# Patient Record
Sex: FEMALE | Race: WHITE | NOT HISPANIC OR LATINO | Employment: OTHER | URBAN - METROPOLITAN AREA
[De-identification: names, ages, dates, MRNs, and addresses within clinical notes are randomized per-mention and may not be internally consistent; named-entity substitution may affect disease eponyms.]

---

## 2017-09-19 DIAGNOSIS — Z01.812 PRE-PROCEDURAL LABORATORY EXAMINATION: ICD-10-CM

## 2017-09-19 LAB
ANION GAP SERPL CALC-SCNC: 6 MMOL/L (ref 0–11.9)
BUN SERPL-MCNC: 17 MG/DL (ref 8–22)
CALCIUM SERPL-MCNC: 9.7 MG/DL (ref 8.5–10.5)
CHLORIDE SERPL-SCNC: 104 MMOL/L (ref 96–112)
CO2 SERPL-SCNC: 26 MMOL/L (ref 20–33)
CREAT SERPL-MCNC: 0.67 MG/DL (ref 0.5–1.4)
ERYTHROCYTE [DISTWIDTH] IN BLOOD BY AUTOMATED COUNT: 44.9 FL (ref 35.9–50)
GFR SERPL CREATININE-BSD FRML MDRD: >60 ML/MIN/1.73 M 2
GLUCOSE SERPL-MCNC: 81 MG/DL (ref 65–99)
HCT VFR BLD AUTO: 43.8 % (ref 37–47)
HGB BLD-MCNC: 14.3 G/DL (ref 12–16)
MCH RBC QN AUTO: 31.3 PG (ref 27–33)
MCHC RBC AUTO-ENTMCNC: 32.6 G/DL (ref 33.6–35)
MCV RBC AUTO: 95.8 FL (ref 81.4–97.8)
PLATELET # BLD AUTO: 196 K/UL (ref 164–446)
PMV BLD AUTO: 10.2 FL (ref 9–12.9)
POTASSIUM SERPL-SCNC: 3.8 MMOL/L (ref 3.6–5.5)
RBC # BLD AUTO: 4.57 M/UL (ref 4.2–5.4)
SODIUM SERPL-SCNC: 136 MMOL/L (ref 135–145)
WBC # BLD AUTO: 3.8 K/UL (ref 4.8–10.8)

## 2017-09-19 PROCEDURE — 85027 COMPLETE CBC AUTOMATED: CPT

## 2017-09-19 PROCEDURE — 36415 COLL VENOUS BLD VENIPUNCTURE: CPT

## 2017-09-19 PROCEDURE — 80048 BASIC METABOLIC PNL TOTAL CA: CPT

## 2017-09-19 RX ORDER — ESTRADIOL AND NORETHINDRONE ACETATE 1; .5 MG/1; MG/1
1 TABLET ORAL DAILY
COMMUNITY
End: 2018-05-17 | Stop reason: SDUPTHER

## 2017-09-28 ENCOUNTER — HOSPITAL ENCOUNTER (OUTPATIENT)
Facility: MEDICAL CENTER | Age: 52
End: 2017-09-28
Attending: SURGERY | Admitting: SURGERY
Payer: COMMERCIAL

## 2017-09-28 VITALS
DIASTOLIC BLOOD PRESSURE: 62 MMHG | HEIGHT: 69 IN | BODY MASS INDEX: 22.27 KG/M2 | OXYGEN SATURATION: 99 % | SYSTOLIC BLOOD PRESSURE: 93 MMHG | RESPIRATION RATE: 18 BRPM | TEMPERATURE: 97.8 F | WEIGHT: 150.35 LBS | HEART RATE: 62 BPM

## 2017-09-28 PROBLEM — K80.20 BILIARY CALCULUS: Status: ACTIVE | Noted: 2017-09-28

## 2017-09-28 PROCEDURE — 160039 HCHG SURGERY MINUTES - EA ADDL 1 MIN LEVEL 3: Performed by: SURGERY

## 2017-09-28 PROCEDURE — 700102 HCHG RX REV CODE 250 W/ 637 OVERRIDE(OP)

## 2017-09-28 PROCEDURE — 501586 HCHG TROCAR, THRD SPIKE 5X55: Performed by: SURGERY

## 2017-09-28 PROCEDURE — 160028 HCHG SURGERY MINUTES - 1ST 30 MINS LEVEL 3: Performed by: SURGERY

## 2017-09-28 PROCEDURE — 502571 HCHG PACK, LAP CHOLE: Performed by: SURGERY

## 2017-09-28 PROCEDURE — 160002 HCHG RECOVERY MINUTES (STAT): Performed by: SURGERY

## 2017-09-28 PROCEDURE — A9270 NON-COVERED ITEM OR SERVICE: HCPCS

## 2017-09-28 PROCEDURE — 501574 HCHG TROCAR, SMTH CAN&SEAL 5: Performed by: SURGERY

## 2017-09-28 PROCEDURE — 700101 HCHG RX REV CODE 250

## 2017-09-28 PROCEDURE — 160009 HCHG ANES TIME/MIN: Performed by: SURGERY

## 2017-09-28 PROCEDURE — 160025 RECOVERY II MINUTES (STATS): Performed by: SURGERY

## 2017-09-28 PROCEDURE — 88304 TISSUE EXAM BY PATHOLOGIST: CPT

## 2017-09-28 PROCEDURE — 160047 HCHG PACU  - EA ADDL 30 MINS PHASE II: Performed by: SURGERY

## 2017-09-28 PROCEDURE — A6402 STERILE GAUZE <= 16 SQ IN: HCPCS | Performed by: SURGERY

## 2017-09-28 PROCEDURE — 501838 HCHG SUTURE GENERAL: Performed by: SURGERY

## 2017-09-28 PROCEDURE — 160046 HCHG PACU - 1ST 60 MINS PHASE II: Performed by: SURGERY

## 2017-09-28 PROCEDURE — 160048 HCHG OR STATISTICAL LEVEL 1-5: Performed by: SURGERY

## 2017-09-28 PROCEDURE — 700111 HCHG RX REV CODE 636 W/ 250 OVERRIDE (IP)

## 2017-09-28 PROCEDURE — 160035 HCHG PACU - 1ST 60 MINS PHASE I: Performed by: SURGERY

## 2017-09-28 PROCEDURE — 500868 HCHG NEEDLE, SURGI(VARES): Performed by: SURGERY

## 2017-09-28 PROCEDURE — 500514 HCHG ENDOCLIP: Performed by: SURGERY

## 2017-09-28 RX ORDER — BUPIVACAINE HYDROCHLORIDE 2.5 MG/ML
INJECTION, SOLUTION EPIDURAL; INFILTRATION; INTRACAUDAL
Status: DISCONTINUED | OUTPATIENT
Start: 2017-09-28 | End: 2017-09-28 | Stop reason: HOSPADM

## 2017-09-28 RX ORDER — OXYCODONE HYDROCHLORIDE AND ACETAMINOPHEN 5; 325 MG/1; MG/1
TABLET ORAL
Status: COMPLETED
Start: 2017-09-28 | End: 2017-09-28

## 2017-09-28 RX ORDER — HYDROCODONE BITARTRATE AND ACETAMINOPHEN 5; 325 MG/1; MG/1
1-2 TABLET ORAL EVERY 4 HOURS PRN
Status: DISCONTINUED | OUTPATIENT
Start: 2017-09-28 | End: 2017-09-28 | Stop reason: HOSPADM

## 2017-09-28 RX ORDER — LIDOCAINE AND PRILOCAINE 25; 25 MG/G; MG/G
1 CREAM TOPICAL
Status: COMPLETED | OUTPATIENT
Start: 2017-09-28 | End: 2017-09-28

## 2017-09-28 RX ORDER — LIDOCAINE HYDROCHLORIDE 10 MG/ML
INJECTION, SOLUTION INFILTRATION; PERINEURAL
Status: COMPLETED
Start: 2017-09-28 | End: 2017-09-28

## 2017-09-28 RX ORDER — SODIUM CHLORIDE, SODIUM LACTATE, POTASSIUM CHLORIDE, CALCIUM CHLORIDE 600; 310; 30; 20 MG/100ML; MG/100ML; MG/100ML; MG/100ML
1000 INJECTION, SOLUTION INTRAVENOUS
Status: DISCONTINUED | OUTPATIENT
Start: 2017-09-28 | End: 2017-09-28 | Stop reason: HOSPADM

## 2017-09-28 RX ORDER — LIDOCAINE HYDROCHLORIDE 10 MG/ML
0.5 INJECTION, SOLUTION INFILTRATION; PERINEURAL
Status: COMPLETED | OUTPATIENT
Start: 2017-09-28 | End: 2017-09-28

## 2017-09-28 RX ORDER — SODIUM CHLORIDE AND POTASSIUM CHLORIDE 150; 900 MG/100ML; MG/100ML
INJECTION, SOLUTION INTRAVENOUS CONTINUOUS
Status: DISCONTINUED | OUTPATIENT
Start: 2017-09-28 | End: 2017-09-28 | Stop reason: HOSPADM

## 2017-09-28 RX ADMIN — LIDOCAINE HYDROCHLORIDE 0.5 ML: 10 INJECTION, SOLUTION INFILTRATION; PERINEURAL at 11:50

## 2017-09-28 RX ADMIN — OXYCODONE AND ACETAMINOPHEN 2 TABLET: 5; 325 TABLET ORAL at 14:30

## 2017-09-28 ASSESSMENT — PAIN SCALES - GENERAL
PAINLEVEL_OUTOF10: 0
PAINLEVEL_OUTOF10: 3
PAINLEVEL_OUTOF10: ASSUMED PAIN PRESENT
PAINLEVEL_OUTOF10: 1
PAINLEVEL_OUTOF10: 3
PAINLEVEL_OUTOF10: 3

## 2017-09-28 NOTE — DISCHARGE INSTRUCTIONS
ACTIVITY: Rest and take it easy for the first 24 hours.  A responsible adult is recommended to remain with you during that time.  It is normal to feel sleepy.  We encourage you to not do anything that requires balance, judgment or coordination.    MILD FLU-LIKE SYMPTOMS ARE NORMAL. YOU MAY EXPERIENCE GENERALIZED MUSCLE ACHES, THROAT IRRITATION, HEADACHE AND/OR SOME NAUSEA.    FOR 24 HOURS DO NOT:  Drive, operate machinery or run household appliances.  Drink beer or alcoholic beverages.   Make important decisions or sign legal documents.    SPECIAL INSTRUCTIONS: Laparoscopic Cholecystectomy D/C instructions:     DIET: Upon discharge from the hospital you may resume your normal preoperative diet. Depending on how you are feeling and whether you have nausea or not, you may wish to stay with a bland diet for the first few days. However, you can advance this as quickly as you feel ready.     ACTIVITIES: After discharge from the hospital, you may resume full routine activities. However, there should be no heavy lifting (greater than 15 pounds) and no strenuous activities until after your follow-up visit. Otherwise, routine activities of daily living are acceptable.     DRIVING: You may drive whenever you are off pain medications and are able to perform the activities needed to drive, i.e. turning, bending, twisting, etc.     BATHING: You may get the wound wet at any time after leaving the hospital. You may shower, but do not submerge in a bath for at least a week. Dressings may come off after 48 hours.     BOWEL FUNCTION: A few patients, after this operation, will develop either frequent or loose stools after meals. This usually corrects itself after a few days, to a few weeks. If this occurs, do not worry; it is not unusual and will resolve. Much more common than loose stools, is constipation. The combination of pain medication and decreased activity level can cause constipation in otherwise normal patients. If you  feel this is occurring, take a laxative (Milk of Magnesia, Ex-Lax, Senokot, etc.) until the problem has resolved.     PAIN MEDICATION: You will be given a prescription for pain medication at discharge. Please take these as directed. It is important to remember not to take medications on an empty stomach as this may cause nausea.     CALL IF YOU HAVE: (1) Fevers to more than 1010 F, (2) Unusual chest or leg pain, (3) Drainage or fluid from incision that may be foul smelling, increased tenderness or soreness at the wound or the wound edges are no longer together, redness or swelling at the incision site. Please do not hesitate to call with any other questions.     APPOINTMENT: Contact our office at [phone] for a follow-up appointment in 1 to 2 weeks following your procedure.     If you have any additional questions, please do not hesitate to call the office.     Office address:   [address] [place] [address], [address] [place] [address]      DIET: To avoid nausea, slowly advance diet as tolerated, avoiding spicy or greasy foods for the first day.  Add more substantial food to your diet according to your physician's instructions.  Babies can be fed formula or breast milk as soon as they are hungry.  INCREASE FLUIDS AND FIBER TO AVOID CONSTIPATION.    SURGICAL DRESSING/BATHING: may remove dressing 9/30 may shower but do not submerge.     FOLLOW-UP APPOINTMENT:  A follow-up appointment should be arranged with your doctor in 10/06; call to schedule.   [phone]    You should CALL YOUR PHYSICIAN if you develop:  Fever greater than 101 degrees F.  Pain not relieved by medication, or persistent nausea or vomiting.  Excessive bleeding (blood soaking through dressing) or unexpected drainage from the wound.  Extreme redness or swelling around the incision site, drainage of pus or foul smelling drainage.  Inability to urinate or empty your bladder within 8 hours.  Problems with breathing or chest pain.    You should call [phone] if you  develop problems with breathing or chest pain.  If you are unable to contact your doctor or surgical center, you should go to the nearest emergency room or urgent care center.  Physician's telephone #:  [phone]    If any questions arise, call your doctor.  If your doctor is not available, please feel free to call the Surgical Center at {Surgical Dept Numbers:[phone]}.  The Center is open Monday through Friday from 7AM to 7PM.  You can also call the HEALTH HOTLINE open 24 hours/day, 7 days/week and speak to a nurse at [phone], or toll free at [phone].    A registered nurse may call you a few days after your surgery to see how you are doing after your procedure.    MEDICATIONS: Resume taking daily medication.  Take prescribed pain medication with food.  If no medication is prescribed, you may take non-aspirin pain medication if needed.  PAIN MEDICATION CAN BE VERY CONSTIPATING.  Take a stool softener or laxative such as senokot, pericolace, or milk of magnesia if needed.    Prescription given for norco .  Last pain medication given at 2:30 pm .    If your physician has prescribed pain medication that includes Acetaminophen (Tylenol), do not take additional Acetaminophen (Tylenol) while taking the prescribed medication.    Depression / Suicide Risk    As you are discharged from this [facility] facility, it is important to learn how to keep safe from harming yourself.    Recognize the warning signs:  · Abrupt changes in personality, positive or negative- including increase in energy   · Giving away possessions  · Change in eating patterns- significant weight changes-  positive or negative  · Change in sleeping patterns- unable to sleep or sleeping all the time   · Unwillingness or inability to communicate  · Depression  · Unusual sadness, discouragement and loneliness  · Talk of wanting to die  · Neglect of personal appearance   · Rebelliousness- reckless behavior  · Withdrawal from people/activities  they love  · Confusion- inability to concentrate     If you or a loved one observes any of these behaviors or has concerns about self-harm, here's what you can do:  · Talk about it- your feelings and reasons for harming yourself  · Remove any means that you might use to hurt yourself (examples: pills, rope, extension cords, firearm)  · Get professional help from the community (Mental Health, Substance Abuse, psychological counseling)  · Do not be alone:Call your Safe Contact- someone whom you trust who will be there for you.  · Call your local CRISIS HOTLINE 185-6220 or 876-881-5940  · Call your local Children's Mobile Crisis Response Team [place] (974) 800-0476 or www.[employer]  · Call the toll free National Suicide Prevention Hotlines   · National Suicide Prevention Lifeline 563-586-DDNG (4996)  · National Hope Line Network 800-SUICIDE (659-2569)

## 2017-09-28 NOTE — PROGRESS NOTES
The Medication Reconciliation process has been completed by interviewing the patient    Allergies have been reviewed  Antibiotic use in 30 days - none    [facility]:  [facility]

## 2017-09-29 NOTE — OP REPORT
DATE OF SERVICE:  09/28/2017    PREOPERATIVE DIAGNOSES:  Symptomatic cholelithiasis and biliary dyskinesia.    POSTOPERATIVE DIAGNOSES:  Symptomatic cholelithiasis and biliary dyskinesia.    PROCEDURE PERFORMED:  Laparoscopic cholecystectomy.    SURGEON:  Claudia Alford MD    ASSISTANT:  Laney Zhao PA-C.    ANESTHESIA:  General endotracheal.    ANESTHESIOLOGIST:  David Alvarenga MD    INDICATIONS:  The patient is a 52-year-old female who has had increasing   symptoms of gallbladder dysfunction.  She is being brought at this time for   laparoscopic cholecystectomy.    FINDINGS:  Single duct, single artery with adhesions around the gallbladder   consistent with chronic cholecystitis.    DESCRIPTION OF PROCEDURE:  After the patient was identified and consented, she   was brought to the operating room and placed in supine position.  Patient   underwent general endotracheal anesthetic.  The patient's abdomen was prepped   and draped in sterile fashion.  The periumbilical was anesthetized with 0.25%   Marcaine, a 1-cm incision was made.  The abdominal wall was lifted up and   Veress needle inserted into the abdominal cavity.  After positive drop test,   pneumoperitoneum was obtained.  The Veress needle was removed.  A 5-mm trocar   was placed.  Under laparoscopic guidance, 5 mm trocars were placed, a 10 mm   trocar was placed in epigastric position and a 5 mm trocar was placed in   epigastric position.  The right subcostal position.  The gallbladder was   lifted up ____ adhesions were taken down.  The duct and artery subcutaneous   tissues, double clipped and transected.  The gallbladder was excised from the   liver bed using electrocautery and was brought through the epigastric port.    The liver bed was irrigated and hemostasis secured.  Port sites removed and   pneumoperitoneum released.  All port sites were closed with 4-0 Vicryls.    Op-Site dressing placed on the wounds.  Patient was extubated and taken to    recovery in stable condition.  All sponge and needle counts were correct.       ____________________________________     MD JEANNE LUCAS / DELMA    DD:  09/28/2017 14:05:20  DT:  09/28/2017 15:35:57    D#:  [id number]  Job#:  [id number]    cc: DARREN STEPHEN MD, SUSHANT PÉREZ MD

## 2017-10-19 ENCOUNTER — APPOINTMENT (RX ONLY)
Dept: URBAN - METROPOLITAN AREA CLINIC 4 | Facility: CLINIC | Age: 52
Setting detail: DERMATOLOGY
End: 2017-10-19

## 2017-10-19 DIAGNOSIS — L81.4 OTHER MELANIN HYPERPIGMENTATION: ICD-10-CM

## 2017-10-19 DIAGNOSIS — D22 MELANOCYTIC NEVI: ICD-10-CM

## 2017-10-19 DIAGNOSIS — D18.0 HEMANGIOMA: ICD-10-CM

## 2017-10-19 DIAGNOSIS — L72.0 EPIDERMAL CYST: ICD-10-CM

## 2017-10-19 DIAGNOSIS — L82.1 OTHER SEBORRHEIC KERATOSIS: ICD-10-CM

## 2017-10-19 PROBLEM — D18.01 HEMANGIOMA OF SKIN AND SUBCUTANEOUS TISSUE: Status: ACTIVE | Noted: 2017-10-19

## 2017-10-19 PROBLEM — D22.62 MELANOCYTIC NEVI OF LEFT UPPER LIMB, INCLUDING SHOULDER: Status: ACTIVE | Noted: 2017-10-19

## 2017-10-19 PROBLEM — D22.39 MELANOCYTIC NEVI OF OTHER PARTS OF FACE: Status: ACTIVE | Noted: 2017-10-19

## 2017-10-19 PROBLEM — D22.71 MELANOCYTIC NEVI OF RIGHT LOWER LIMB, INCLUDING HIP: Status: ACTIVE | Noted: 2017-10-19

## 2017-10-19 PROBLEM — D22.61 MELANOCYTIC NEVI OF RIGHT UPPER LIMB, INCLUDING SHOULDER: Status: ACTIVE | Noted: 2017-10-19

## 2017-10-19 PROBLEM — D22.5 MELANOCYTIC NEVI OF TRUNK: Status: ACTIVE | Noted: 2017-10-19

## 2017-10-19 PROBLEM — D22.72 MELANOCYTIC NEVI OF LEFT LOWER LIMB, INCLUDING HIP: Status: ACTIVE | Noted: 2017-10-19

## 2017-10-19 PROCEDURE — ? BENIGN DESTRUCTION COSMETIC

## 2017-10-19 PROCEDURE — ? COUNSELING

## 2017-10-19 PROCEDURE — 99203 OFFICE O/P NEW LOW 30 MIN: CPT

## 2017-10-19 PROCEDURE — ? OBSERVATION

## 2017-10-19 ASSESSMENT — LOCATION DETAILED DESCRIPTION DERM
LOCATION DETAILED: RIGHT PROXIMAL DORSAL FOREARM
LOCATION DETAILED: LEFT INFERIOR FOREHEAD
LOCATION DETAILED: RIGHT PROXIMAL PRETIBIAL REGION
LOCATION DETAILED: RIGHT MEDIAL EYEBROW
LOCATION DETAILED: LEFT DISTAL DORSAL FOREARM
LOCATION DETAILED: LOWER STERNUM
LOCATION DETAILED: RIGHT PROXIMAL POSTERIOR UPPER ARM
LOCATION DETAILED: RIGHT SUPERIOR UPPER BACK
LOCATION DETAILED: RIGHT SUPERIOR MEDIAL BUCCAL CHEEK
LOCATION DETAILED: RIGHT POPLITEAL SKIN
LOCATION DETAILED: RIGHT VENTRAL PROXIMAL FOREARM
LOCATION DETAILED: LEFT LATERAL ELBOW
LOCATION DETAILED: RIGHT CENTRAL EYEBROW
LOCATION DETAILED: RIGHT LATERAL SUPERIOR CHEST
LOCATION DETAILED: MIDDLE STERNUM
LOCATION DETAILED: SUBXIPHOID
LOCATION DETAILED: LEFT POPLITEAL SKIN
LOCATION DETAILED: INFERIOR THORACIC SPINE
LOCATION DETAILED: RIGHT DISTAL POSTERIOR UPPER ARM
LOCATION DETAILED: LEFT PROXIMAL PRETIBIAL REGION
LOCATION DETAILED: RIGHT DISTAL POSTERIOR THIGH
LOCATION DETAILED: LEFT VENTRAL LATERAL PROXIMAL FOREARM
LOCATION DETAILED: LEFT SUPERIOR MEDIAL BUCCAL CHEEK
LOCATION DETAILED: LEFT DISTAL POSTERIOR UPPER ARM
LOCATION DETAILED: LEFT MEDIAL PLANTAR MIDFOOT
LOCATION DETAILED: LEFT DISTAL POSTERIOR THIGH
LOCATION DETAILED: RIGHT ANTERIOR DISTAL THIGH

## 2017-10-19 ASSESSMENT — LOCATION ZONE DERM
LOCATION ZONE: FACE
LOCATION ZONE: ARM
LOCATION ZONE: LEG
LOCATION ZONE: FEET
LOCATION ZONE: TRUNK

## 2017-10-19 ASSESSMENT — LOCATION SIMPLE DESCRIPTION DERM
LOCATION SIMPLE: RIGHT POSTERIOR THIGH
LOCATION SIMPLE: LEFT ELBOW
LOCATION SIMPLE: RIGHT PRETIBIAL REGION
LOCATION SIMPLE: LEFT POSTERIOR UPPER ARM
LOCATION SIMPLE: RIGHT POSTERIOR UPPER ARM
LOCATION SIMPLE: ABDOMEN
LOCATION SIMPLE: LEFT FOREARM
LOCATION SIMPLE: LEFT PLANTAR SURFACE
LOCATION SIMPLE: CHEST
LOCATION SIMPLE: LEFT POSTERIOR THIGH
LOCATION SIMPLE: LEFT CHEEK
LOCATION SIMPLE: RIGHT CHEEK
LOCATION SIMPLE: LEFT PRETIBIAL REGION
LOCATION SIMPLE: RIGHT THIGH
LOCATION SIMPLE: RIGHT POPLITEAL SKIN
LOCATION SIMPLE: LEFT FOREHEAD
LOCATION SIMPLE: RIGHT FOREARM
LOCATION SIMPLE: UPPER BACK
LOCATION SIMPLE: LEFT POPLITEAL SKIN
LOCATION SIMPLE: RIGHT EYEBROW
LOCATION SIMPLE: RIGHT UPPER BACK

## 2017-12-27 LAB — HBA1C MFR BLD: 5.6 % (ref ?–5.8)

## 2018-04-12 ENCOUNTER — OFFICE VISIT (OUTPATIENT)
Dept: URGENT CARE | Facility: CLINIC | Age: 53
End: 2018-04-12
Payer: COMMERCIAL

## 2018-04-12 VITALS
OXYGEN SATURATION: 96 % | TEMPERATURE: 97.8 F | RESPIRATION RATE: 18 BRPM | HEART RATE: 78 BPM | WEIGHT: 152 LBS | DIASTOLIC BLOOD PRESSURE: 80 MMHG | BODY MASS INDEX: 22.51 KG/M2 | HEIGHT: 69 IN | SYSTOLIC BLOOD PRESSURE: 122 MMHG

## 2018-04-12 DIAGNOSIS — J01.80 OTHER ACUTE SINUSITIS, RECURRENCE NOT SPECIFIED: ICD-10-CM

## 2018-04-12 DIAGNOSIS — K14.6 TONGUE PAIN: ICD-10-CM

## 2018-04-12 DIAGNOSIS — H69.92 DYSFUNCTION OF LEFT EUSTACHIAN TUBE: ICD-10-CM

## 2018-04-12 DIAGNOSIS — S46.911A STRAIN OF RIGHT UPPER ARM, INITIAL ENCOUNTER: ICD-10-CM

## 2018-04-12 PROCEDURE — 99204 OFFICE O/P NEW MOD 45 MIN: CPT | Performed by: FAMILY MEDICINE

## 2018-04-12 RX ORDER — AMOXICILLIN AND CLAVULANATE POTASSIUM 875; 125 MG/1; MG/1
1 TABLET, FILM COATED ORAL 2 TIMES DAILY
Qty: 20 TAB | Refills: 0 | Status: SHIPPED | OUTPATIENT
Start: 2018-04-12 | End: 2018-05-17

## 2018-04-12 ASSESSMENT — ENCOUNTER SYMPTOMS
FEVER: 0
SHORTNESS OF BREATH: 0
EYE DISCHARGE: 0
CHILLS: 0
CARDIOVASCULAR NEGATIVE: 1
NAUSEA: 0
PSYCHIATRIC NEGATIVE: 1
DIZZINESS: 0
EYE REDNESS: 0
SORE THROAT: 1
DIARRHEA: 0
MUSCULOSKELETAL NEGATIVE: 1
VOMITING: 0
HEADACHES: 0
SPUTUM PRODUCTION: 0
COUGH: 1
SINUS PAIN: 1

## 2018-04-13 NOTE — PROGRESS NOTES
Subjective:      Celena Phan is a 52 y.o. female who presents with Cough (Over a week dry cough , stuffy nose , sinus headache )    Patient presents to urgent care with almost 2 weeks of nasal congestion dry cough sinus pressure and some ear pressure mild dizziness no nausea vomiting or diarrhea she has been taking over-the-counter medications with minimal relief in symptoms.    She also adds that she bit her tongue earlier this week and she still has a little lump on her tongue which is been giving her some mild discomfort    Also she has right arm pain she's going to see a specialist regarding this no known inciting trauma she feels pain with abduction of the arm no numbness tingling or weakness distally. She has not been taking any ibuprofen or other medication for symptomatic relief.      HPI  Review of Systems   Constitutional: Positive for malaise/fatigue. Negative for chills and fever.   HENT: Positive for congestion, ear pain, sinus pain and sore throat.    Eyes: Negative for discharge and redness.   Respiratory: Positive for cough. Negative for sputum production and shortness of breath.    Cardiovascular: Negative.    Gastrointestinal: Negative for diarrhea, nausea and vomiting.   Genitourinary: Negative.    Musculoskeletal: Negative.    Skin: Negative.    Neurological: Negative for dizziness and headaches.   Psychiatric/Behavioral: Negative.       PMH:  has a past medical history of Heart burn; Heart valve disease (1990); and Pain.  MEDS:   Current Outpatient Prescriptions:   •  estradiol-norethindrone (ACTIVELLA) 1-0.5 MG per tablet, Take 1 Tab by mouth every day., Disp: , Rfl:   •  B Complex Vitamins (VITAMIN B COMPLEX PO), Take 1 Tab by mouth every day., Disp: , Rfl:   •  Calcium-Magnesium-Vitamin D (CALCIUM MAGNESIUM PO), Take 1 Tab by mouth every day., Disp: , Rfl:   •  POTASSIUM PO, Take 1 Tab by mouth every day. OTC - Supplement, Disp: , Rfl:   ALLERGIES:   Allergies   Allergen Reactions  "  • Erythromycin Itching   SURGHX:   Past Surgical History:   Procedure Laterality Date   • MIKO BY LAPAROSCOPY  9/28/2017    Procedure: MIKO BY LAPAROSCOPY;  Surgeon: Claudia Alford M.D.;  Location: SURGERY [facility];  Service: General   • KYPHOPLASTY  4/1/2010    Performed by SUSAN RIVERA at SURGERY [facility]   SOCHX:  reports that she has never smoked. She has never used smokeless tobacco. She reports that she does not drink alcohol or use drugs.  FH: Family history was reviewed, no pertinent findings to report     Objective:     /80   Pulse 78   Temp 36.6 °C (97.8 °F)   Resp 18   Ht 1.753 m (5' 9\")   Wt 68.9 kg (152 lb)   LMP 03/25/2010   SpO2 96%   BMI 22.45 kg/m²      Physical Exam   Constitutional: She is oriented to person, place, and time. She appears well-developed and well-nourished. No distress.   HENT:   Nasal congestion is present, mild pharyngeal erythema, left tm with erythema and bulge,    Eyes: Conjunctivae and EOM are normal. Pupils are equal, round, and reactive to light.   Neck: Normal range of motion.   Cardiovascular: Normal rate, regular rhythm, normal heart sounds and intact distal pulses.  Exam reveals no gallop and no friction rub.    No murmur heard.  Pulmonary/Chest: Effort normal and breath sounds normal. No respiratory distress. She has no wheezes. She has no rales. She exhibits no tenderness.   Musculoskeletal: Normal range of motion. She exhibits no edema or deformity.        Right shoulder: She exhibits tenderness. She exhibits normal range of motion, no bony tenderness, no pain, no spasm, normal pulse and normal strength.        Arms:  Rom wnl ttp and with abduction motor strength intact   Neurological: She is alert and oriented to person, place, and time.   Skin: Skin is warm and dry. No rash noted. She is not diaphoretic. No erythema. No pallor.   Psychiatric: She has a normal mood and affect. Her behavior is normal. Judgment and thought content " normal.         Assessment/Plan:     1. Other acute sinusitis, recurrence not specified     2. Strain of right upper arm, initial encounter     3. Dysfunction of left eustachian tube     4. Tongue pain       Augmentin x10 days  Differential diagnosis, natural history, supportive care discussed. Follow-up with primary care provider within 7-10 days, emergency room precautions discussed.  Patient and/or family appears understanding of information.

## 2018-05-17 ENCOUNTER — OFFICE VISIT (OUTPATIENT)
Dept: MEDICAL GROUP | Facility: MEDICAL CENTER | Age: 53
End: 2018-05-17
Payer: COMMERCIAL

## 2018-05-17 VITALS
HEART RATE: 67 BPM | SYSTOLIC BLOOD PRESSURE: 102 MMHG | HEIGHT: 69 IN | TEMPERATURE: 97.9 F | BODY MASS INDEX: 22.22 KG/M2 | WEIGHT: 150 LBS | DIASTOLIC BLOOD PRESSURE: 64 MMHG | OXYGEN SATURATION: 99 %

## 2018-05-17 DIAGNOSIS — Z76.89 ENCOUNTER TO ESTABLISH CARE WITH NEW DOCTOR: ICD-10-CM

## 2018-05-17 DIAGNOSIS — R53.83 OTHER FATIGUE: ICD-10-CM

## 2018-05-17 DIAGNOSIS — R25.2 BILATERAL LEG CRAMPS: ICD-10-CM

## 2018-05-17 DIAGNOSIS — Z79.890 HORMONE REPLACEMENT THERAPY (POSTMENOPAUSAL): ICD-10-CM

## 2018-05-17 DIAGNOSIS — H81.11 BENIGN PAROXYSMAL POSITIONAL VERTIGO OF RIGHT EAR: ICD-10-CM

## 2018-05-17 DIAGNOSIS — H53.9 VISUAL CHANGES: ICD-10-CM

## 2018-05-17 PROBLEM — H81.10 BPPV (BENIGN PAROXYSMAL POSITIONAL VERTIGO): Status: ACTIVE | Noted: 2018-05-17

## 2018-05-17 PROBLEM — K80.20 BILIARY CALCULUS: Status: RESOLVED | Noted: 2017-09-28 | Resolved: 2018-05-17

## 2018-05-17 PROCEDURE — 99214 OFFICE O/P EST MOD 30 MIN: CPT | Performed by: FAMILY MEDICINE

## 2018-05-17 RX ORDER — ESTRADIOL AND NORETHINDRONE ACETATE 1; .5 MG/1; MG/1
1 TABLET ORAL DAILY
Qty: 90 TAB | Refills: 0 | Status: SHIPPED | OUTPATIENT
Start: 2018-05-17 | End: 2018-12-10

## 2018-05-17 ASSESSMENT — PATIENT HEALTH QUESTIONNAIRE - PHQ9: CLINICAL INTERPRETATION OF PHQ2 SCORE: 0

## 2018-05-17 NOTE — LETTER
[facility]  Daryn Albarado M.D.  [address]  [place] [address]  Fax: [phone]   Authorization for Release/Disclosure of   Protected Health Information   Name: INDIRA MASON : 1965 SSN: [id number]   Address: [address] [place] [address] Phone:    [phone] (home) [phone] (work)   I authorize the entity listed below to release/disclose the PHI below to:   [facility]/Daryn Albarado M.D. and Daryn Albarado M.D.   Provider or Entity Name:  [facility]   Address   City, [facility], Zip:               [address] [place] [address], [place] [address]   Phone:  [phone]      Fax:      [phone]        Reason for request: continuity of care   Information to be released:    [ X ] LAST COLONOSCOPY,  including any PATH REPORT and follow-up  [ X ] LAST FIT/COLOGUARD RESULT [  ] LAST DEXA  [  ] LAST MAMMOGRAM  [  ] LAST PAP  [  ] LAST LABS [  ] RETINA EXAM REPORT  [  ] IMMUNIZATION RECORDS  [  ] Release all info      [  ] Check here and initial the line next to each item to release ALL health information INCLUDING  _____ Care and treatment for drug and / or alcohol abuse  _____ HIV testing, infection status, or AIDS  _____ Genetic Testing    DATES OF SERVICE OR TIME PERIOD TO BE DISCLOSED: _____________  I understand and acknowledge that:  * This Authorization may be revoked at any time by you in writing, except if your health information has already been used or disclosed.  * Your health information that will be used or disclosed as a result of you signing this authorization could be re-disclosed by the recipient. If this occurs, your re-disclosed health information may no longer be protected by State or Federal laws.  * You may refuse to sign this Authorization. Your refusal will not affect your ability to obtain treatment.  * This Authorization becomes effective upon signing and will  on (date) __________.      If no date is indicated, this  Authorization will  one (1) year from the signature date.    Name: Celena Phan    Signature:   Date:     2018       PLEASE FAX REQUESTED RECORDS BACK TO: [phone]

## 2018-05-17 NOTE — LETTER
[facility]  Daryn Albarado M.D.  [address]  [place] [address]  Fax: [phone]   Authorization for Release/Disclosure of   Protected Health Information   Name: CELENA АННА PHAN : 1965 SSN: [id number]   Address: [facility] [address] [place] [address] Phone:    [phone] (home) [phone] (work)   I authorize the entity listed below to release/disclose the PHI below to:   [facility]/Daryn Albarado M.D. and Dayrn Albarado M.D.   Provider or Entity Name:     Address   City, State, Zip   Phone:      Fax:     Reason for request: continuity of care   Information to be released:    [  ] LAST COLONOSCOPY,  including any PATH REPORT and follow-up  [  ] LAST FIT/COLOGUARD RESULT [  ] LAST DEXA  [  ] LAST MAMMOGRAM  [  ] LAST PAP  [  ] LAST LABS [  ] RETINA EXAM REPORT  [  ] IMMUNIZATION RECORDS  [  ] Release all info      [  ] Check here and initial the line next to each item to release ALL health information INCLUDING  _____ Care and treatment for drug and / or alcohol abuse  _____ HIV testing, infection status, or AIDS  _____ Genetic Testing    DATES OF SERVICE OR TIME PERIOD TO BE DISCLOSED: _____________  I understand and acknowledge that:  * This Authorization may be revoked at any time by you in writing, except if your health information has already been used or disclosed.  * Your health information that will be used or disclosed as a result of you signing this authorization could be re-disclosed by the recipient. If this occurs, your re-disclosed health information may no longer be protected by State or Federal laws.  * You may refuse to sign this Authorization. Your refusal will not affect your ability to obtain treatment.  * This Authorization becomes effective upon signing and will  on (date) __________.      If no date is indicated, this Authorization will  one (1) year from the signature date.    Name: Celena Phan    Signature:   Date:          5/17/2018       PLEASE FAX REQUESTED RECORDS BACK TO: [phone]

## 2018-05-20 NOTE — ASSESSMENT & PLAN NOTE
Chronic, stable, well controlled with use of potassium on her potassium she will have resumption of bilateral lower extremity leg cramps in her calves.      Patient otherwise with no signs of atherosclerotic disease, can have cardiovascular exercise with neither ischemic cardiomyopathy nor leg cramps, and no signs or symptoms of CVA.

## 2018-05-20 NOTE — PROGRESS NOTES
Subjective:   Chief Complaint/History of Present Illness:  Celena Phan is a 52 y.o. female patient new to [facility] who presents today to establish primary medical care and to discuss the following medical conditions.      Diagnoses of Encounter to establish care with new doctor, Visual changes, Benign paroxysmal positional vertigo of right ear, Bilateral leg cramps, Hormone replacement therapy (postmenopausal), and Other fatigue were pertinent to this visit.    PMH, PSH, Social History, Medications, Allergies all reviewed as documented:    Visual changes  Patient with visual changes described as intermittent visual zigzag lines that are associated with flashes at times.  This has been occurring for 4 years total, however has been increasing in frequency over the last 3-6 months.  These can be worsened with turning her head to the right, however this does not prevent her from doing her normal ADLs, including driving (and her check of blindspot areas).    No s/sx of stroke nor ischemic cardiomyopathy.  No blunt head trauma, no rapid acceleration/deceleration injuries.      ROS is NEGATIVE for confusion, altered mentation, word finding difficulty, memory loss, diplopia, visual scotomas, facial droop, dysarthria, dysphagia, hemiplegia, gait instability, new/abnormal paresthesias/numbness.    ROS is NEGATIVE for dizziness, generalized weakness/fatigue, cold sweats, dizziness,  vision/hearing changes, jaw pain/paresthesias, BUE pain/paresthesias/numbness/weakness, chest pain/pressure, palpitations, dyspnea, nausea, RUQ abdominal pain, oliguria/anuria, BLE edema.    BPPV (benign paroxysmal positional vertigo)  New problem, uncontrolled, please see notes from same date of service 5/17/2018 or guarding visual changes.    Bilateral leg cramps  Chronic, stable, well controlled with use of potassium on her potassium she will have resumption of bilateral lower extremity leg cramps in her calves.      Patient otherwise  with no signs of atherosclerotic disease, can have cardiovascular exercise with neither ischemic cardiomyopathy nor leg cramps, and no signs or symptoms of CVA.    Hormone replacement therapy (postmenopausal)  Chronic, stable, well controlled.  Patient verbalized understanding that there is an increased risk of hormone replacement therapy.  Patient has had mammograms in the past, and they have always been negative.  Patient without any signs of abdominal fullness, abdominal pain, vaginal discharge, vaginal/pelvic cramping      Patient Active Problem List    Diagnosis Date Noted   • Hormone replacement therapy (postmenopausal) 05/17/2018   • Bilateral leg cramps 05/17/2018   • Visual changes 05/17/2018   • BPPV (benign paroxysmal positional vertigo) 05/17/2018   • Other fatigue 05/17/2018       Additional History:   Allergies:    Erythromycin     Medications:     Current Outpatient Prescriptions Ordered in [facility]   Medication Sig Dispense Refill   • NON SPECIFIED 1 Each by Other route every day. For 1-2weeks  1) No furry creatures in bed   2) 6-8hours of sleep, as uninterrupted as possible 1 Each 9999   • estradiol-norethindrone (ACTIVELLA) 1-0.5 MG per tablet Take 1 Tab by mouth every day. 90 Tab 0   • B Complex Vitamins (VITAMIN B COMPLEX PO) Take 1 Tab by mouth every day.     • POTASSIUM PO Take 1 Tab by mouth every day. OTC - Supplement       No current Epic-ordered facility-administered medications on file.         Past Medical History:     Past Medical History:   Diagnosis Date   • Heart burn    • Heart valve disease 1990    MVP   • Pain     back        Past Surgical History:     Past Surgical History:   Procedure Laterality Date   • MIKO BY LAPAROSCOPY  9/28/2017    Procedure: MIKO BY LAPAROSCOPY;  Surgeon: Claudia Alford M.D.;  Location: SURGERY [facility];  Service: General   • KYPHOPLASTY  4/1/2010    Performed by SUSAN RIVERA at [facility]        Social History:     Social History  "  Substance Use Topics   • Smoking status: Never Smoker   • Smokeless tobacco: Never Used   • Alcohol use No      Comment: rarely        Family History:     No family status information on file.        Family History   Problem Relation Age of Onset   • Other Neg Hx        ROS:     - Constitutional: Negative for fever, chills, unexpected weight change, and fatigue/generalized weakness.     - Respiratory: Negative for cough, sputum production, chest congestion, dyspnea, wheezing, and crackles.      - Cardiovascular: Negative for chest pain, palpitations, orthopnea, and bilateral lower extremity edema.     - NOTE: All other systems reviewed and are negative, except as in HPI.     Objective:   Physical Exam:    Vitals: Blood pressure 102/64, pulse 67, temperature 36.6 °C (97.9 °F), height 1.753 m (5' 9\"), weight 68 kg (150 lb), last menstrual period 03/25/2010, SpO2 99 %.   BMI: Body mass index is 22.15 kg/m².   General/Constitutional: Vitals as above, Well nourished, well developed female in no acute distress   Head/Eyes:  - Head is grossly normal & atraumatic  - Bilateral conjunctivae clear and not injected, bilateral EOMI, bilateral PERRL   ENT:   - Bilateral external ears grossly normal in appearance, external auditory canals clear & bilateral TMs visualized with appropriate cone of light reflex, hearing grossly intact  - External nares normal in appearance and without discharge/bleeding, bilateral turbinates non-erythematous/non-edematous and without discharge/bleeding  - Good dentition ,  posterior oropharynx without erythema/edema/exudates  Neck: Neck supple, no masses, neck non-tender to palpation, no thyromegaly/goiter   Respiratory: No respiratory distress, bilateral lungs are clear to auscultation in all lung fields (anterior/lateral/posterior), no wheezing/rhonchi/rales   Cardiovascular: Regular rate and rhythm without murmur/gallops/rubs, distal pulses equal and 2+ bilaterally (radial, posterior tibial), no " bilateral lower extremity edema   Gastrointestinal: Abdomen resonant to percussion, Bowel sounds present in all 4 quadrants, abdomen non-tender to light and deep palpation   MSK: Gait grossly normal & not antalgic, no tenderness to percussion of vertebral processes, no CVAT, no bilateral SI joint tenderness   Integumentary: No apparent rashes   Neuro: Gross motor movement intact in all 4 extremities, Gross sensation intact to extremities and trunk, Gait grossly normal and not antalgic   Psych: Judgment grossly appropriate, no apparent depression/anxiety    Health Maintenance:     - I have requested previous records (Dr. Keith, former Ob/Gyn, former GI), and will update accordingly.    Imaging/Labs:     - I have requested previous records (see above), and will update accordingly.    Assessment and Plan:   1. Encounter to establish care with new doctor  Patient transferring primary medical care to me from Dr. Keith of [facility].    2. Visual changes  Acute exacerbation of chronic condition, uncontrolled, referral to ophthalmology for further evaluation   - REFERRAL TO OPHTHALMOLOGY    3. Benign paroxysmal positional vertigo of right ear  New problem, uncontrolled, referral to PT for vestibular therapy.   - REFERRAL TO PHYSICAL THERAPY Reason for Therapy: Eval/Treat/Report    4. Bilateral leg cramps  Chronic, stable, well-controlled on daily use of potassium supplement.    5. Hormone replacement therapy (postmenopausal)  Chronic, stable, well controlled on HRT.  We will request previous mammograms and well woman exams.   - estradiol-norethindrone (ACTIVELLA) 1-0.5 MG per tablet; Take 1 Tab by mouth every day.  Dispense: 90 Tab; Refill: 0    6. Other fatigue  New problem, uncontrolled, likely secondary to abnormal sleep patterns with multiple animals in bed.   - NON SPECIFIED; 1 Each by Other route every day. For 1-2weeks  1) No furry creatures in bed   2) 6-8hours of sleep, as uninterrupted as possible   Dispense: 1 Each; Refill: 9999      RTC: In 6 months for annual medical exam.    PLEASE NOTE: This dictation was created using voice recognition software. I have made every reasonable attempt to correct obvious errors, but I expect that there are errors of grammar and possibly content that I did not discover before finalizing the note.

## 2018-05-20 NOTE — ASSESSMENT & PLAN NOTE
Chronic, stable, well controlled.  Patient verbalized understanding that there is an increased risk of hormone replacement therapy.  Patient has had mammograms in the past, and they have always been negative.  Patient without any signs of abdominal fullness, abdominal pain, vaginal discharge, vaginal/pelvic cramping

## 2018-05-20 NOTE — ASSESSMENT & PLAN NOTE
Patient with visual changes described as intermittent visual zigzag lines that are associated with flashes at times.  This has been occurring for 4 years total, however has been increasing in frequency over the last 3-6 months.  These can be worsened with turning her head to the right, however this does not prevent her from doing her normal ADLs, including driving (and her check of blindspot areas).    No s/sx of stroke nor ischemic cardiomyopathy.  No blunt head trauma, no rapid acceleration/deceleration injuries.      ROS is NEGATIVE for confusion, altered mentation, word finding difficulty, memory loss, diplopia, visual scotomas, facial droop, dysarthria, dysphagia, hemiplegia, gait instability, new/abnormal paresthesias/numbness.    ROS is NEGATIVE for dizziness, generalized weakness/fatigue, cold sweats, dizziness,  vision/hearing changes, jaw pain/paresthesias, BUE pain/paresthesias/numbness/weakness, chest pain/pressure, palpitations, dyspnea, nausea, RUQ abdominal pain, oliguria/anuria, BLE edema.

## 2018-05-20 NOTE — ASSESSMENT & PLAN NOTE
New problem, uncontrolled, please see notes from same date of service 5/17/2018 or guarding visual changes.

## 2018-05-31 ENCOUNTER — PHYSICAL THERAPY (OUTPATIENT)
Dept: PHYSICAL THERAPY | Facility: REHABILITATION | Age: 53
End: 2018-05-31
Attending: FAMILY MEDICINE
Payer: COMMERCIAL

## 2018-05-31 DIAGNOSIS — R26.89 IMBALANCE: ICD-10-CM

## 2018-05-31 DIAGNOSIS — R42 DIZZINESS AND GIDDINESS: ICD-10-CM

## 2018-05-31 PROCEDURE — 95992 CANALITH REPOSITIONING PROC: CPT

## 2018-05-31 PROCEDURE — 97161 PT EVAL LOW COMPLEX 20 MIN: CPT

## 2018-05-31 ASSESSMENT — ENCOUNTER SYMPTOMS
PAIN SCALE AT HIGHEST: 7
PAIN SCALE AT LOWEST: 0
PAIN SCALE: 0

## 2018-05-31 NOTE — OP THERAPY EVALUATION
Outpatient Physical Therapy  VESTIBULAR EVALUATION    [address]  [address]  [place] [address]  Phone:  [phone]  Fax:  [phone]    Date of Evaluation: 2018    Patient: Celena Phan  YOB: 1965  MRN: [id number]     Referring Provider: Daryn Albarado M.D.  [address]  [address]  [place] [address]   Referring Diagnosis: Benign paroxysmal positional vertigo of right ear [H81.11]     Time Calculation  Start time: 833  Stop time: 924 Time Calculation (min): 51 minutes     Physical Therapy Occurrence Codes    Date physical therapy care plan established or reviewed:  18   Date physical therapy treatment started:  18          Chief Complaint: Vertigo    Visit Diagnoses     ICD-10-CM   1. Dizziness and giddiness R42   2. Imbalance R26.89         History of Present Illness:     Mechanism of injury:  Pt presents to PT with complaint of vertigo.  The first started about 10 years ago and has been episodic since.  Sometimes coinciding with illness, but not always.  Usually occurring in the morning, but true vertigo never lasting longer than 1 min. Reports motion sickness through childhood.  About 1.5 months ago: had a bad cold with ear infection.  After completing antibiotics, drove up to [facility] and thinks the altitude contributed to this onset.  Happens every time she lays down and tonya when she rolls to the R.  Does notice some difficulty focusing eyes.     Getting over R frozen shoulder.     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  Hikes, bikes, tennis, gardens.      Headaches: no headaches      Ear problems:  None    Sleep disturbance:  Interrupted sleep  Symptoms:     Current symptom ratin    At best symptom ratin    At worst symptom ratin    Progression:  Stable  Social Support:     Lives in:  One-story house    Lives with:  Spouse  Diagnostic Tests:     No diagnostic tests  were performed to date      Diagnostic tests comments:  VNG completed approx 8-10 yrs ago, does not recall the results.   Treatments:     No prior treatments received    Patient goals:     Other patient goals:  Resolve vertigo.       Past Medical History:   Diagnosis Date   • Heart burn    • Heart valve disease 1990    MVP   • Pain     back     Past Surgical History:   Procedure Laterality Date   • MIKO BY LAPAROSCOPY  9/28/2017    Procedure: MIKO BY LAPAROSCOPY;  Surgeon: Claudia Alford M.D.;  Location: SURGERY [facility];  Service: General   • KYPHOPLASTY  4/1/2010    Performed by SUSAN RIVERA at SURGERY [facility]     Social History   Substance Use Topics   • Smoking status: Never Smoker   • Smokeless tobacco: Never Used   • Alcohol use No      Comment: rarely     Family and Occupational History     Social History   • Marital status:      Spouse name: N/A   • Number of children: N/A   • Years of education: N/A       Objective:    Gait:     Assessment: difficulty with concurrent head rotation  Oculomotor Exam:         Details: No spontaneous nystagmus central gaze          Details: No spontaneous nystagmus eccentric gaze    No saccadic eye movements    Smooth pursuit present    Convergence:        Normal convergence    No skew  Active Range of Motion:     Within functional limits  Limb Ataxia Exam:     Finger-to-Nose:         Intention tremor: none    Dysdiadochokinesia: none.  Strength Exam:     Upper extremities within functional limits    Lower extremities within functional limits  Reflex Exam:       Deep Tendon Reflexes:         Left L4 (Patellar): normal (2+)        Right L4 (Patellar): normal (2+)  Sensation Tests:     Left Light Touch Sensation:         All left lumbar dermatomes intact      Right Light Touch Sensation:         All right lumbar dermatomes intact      Vestibulo-Ocular Exam:   Normal head thrust test  BPPV Exam:     Abnormal Watkins-Hallpike        Latency (sec): 2         Duration (sec): 11        Findings: positive right and torsional nystagmus    Normal straight head-hanging test    Normal roll test        Therapeutic Treatments and Modalities:     1. Canalith Repositioning (CPT 79276), R Epley completed x 2.  Home instruction and HO given.     Time-based treatments/modalities:            Assessment:     Functional impairments:  Positive BPPV (right)    Other impairments:  Motion sensitivity.     Assessment details:  Mrs. Phan is a 52 y.o female who presents to PT with complaint of vertigo.  PT eval is consistent with R posterior canalithasis type BPPV.  Tx was well tolerated and HEP well understood.  Skilled PT services are indicated to address the mentioned functional limitations and enhance QOL.       Barriers to therapy:  None    Prognosis: good    Goals:     Short term goals:  - Resolve nystagmus with R mary hallpike.     Short term goal time span:  1-2 weeks    Long term goals:  - Improve DHI at least 15%  - Balance testing WNL  - Indep with HEP    Long term goal time span:  4-6 weeks  Plan:     Therapy options:  Physical therapy treatment to continue    Planned therapy interventions:  Canalith Repositioning (CPT 98951), Neuromuscular Re-education (CPT 23878), Therapeutic Exercise (CPT 68401) and Therapeutic Activities (CPT 48872)    Discussed with:  Patient     Plan details:  1-2x/week for 4 weeks.       Functional Limitation G-Codes and Severity Modifiers  Dizziness Handicap Inventory - Physical Items Score: 16  Dizziness Handicap Inventory - Emotional Items Score: 0  Dizziness Handicap Inventory - Functional Items Score: 4  Dizziness Handicap Inventory - Total Score: 20     Referring provider co-signature:  I have reviewed this plan of care and my co-signature certifies the need for services.  Certification Dates:   From 5/31/18     To 6/28/18    Physician Signature: ________________________________ Date: ______________

## 2018-06-04 ENCOUNTER — TELEPHONE (OUTPATIENT)
Dept: MEDICAL GROUP | Facility: MEDICAL CENTER | Age: 53
End: 2018-06-04

## 2018-06-04 NOTE — TELEPHONE ENCOUNTER
DOCUMENTATION OF PAR STATUS:    1. Name of Medication & Dose: estradiol-norethindrone (ACTIVELLA) 1-0.5 MG per tablet      2. Name of Prescription Coverage Company & phone #: [facility]     3. Date Prior Auth Submitted: 06/04/2018    4. What information was given to obtain insurance decision? In Chart    5. Prior Auth Status? Pending    6. Patient Notified: N\A - Pending     Done through covermymeds

## 2018-06-04 NOTE — TELEPHONE ENCOUNTER
FINAL PRIOR AUTHORIZATION STATUS:    1.  Name of Medication & Dose: Activella 1-0.5MG     2. Prior Auth Status: PRIOR AUTH NOT REQUIRED    3. Action Taken: Pharmacy Notified: yes Patient Notified: yes

## 2018-06-04 NOTE — TELEPHONE ENCOUNTER
MEDICATION PRIOR AUTHORIZATION NEEDED:    1. Name of Medication: estradiol-norethindrone (ACTIVELLA) 1-0.5 MG per tablet     2. Requested By (Name of Pharmacy): [facility]      3. Is insurance on file current? Yes    4. What is the name & phone number of the 3rd party payor? [facility]

## 2018-06-10 ENCOUNTER — OFFICE VISIT (OUTPATIENT)
Dept: URGENT CARE | Facility: CLINIC | Age: 53
End: 2018-06-10
Payer: COMMERCIAL

## 2018-06-10 VITALS
TEMPERATURE: 98.2 F | OXYGEN SATURATION: 99 % | BODY MASS INDEX: 21.92 KG/M2 | WEIGHT: 148 LBS | RESPIRATION RATE: 16 BRPM | HEART RATE: 68 BPM | HEIGHT: 69 IN | SYSTOLIC BLOOD PRESSURE: 110 MMHG | DIASTOLIC BLOOD PRESSURE: 68 MMHG

## 2018-06-10 DIAGNOSIS — H81.10 BPPV (BENIGN PAROXYSMAL POSITIONAL VERTIGO), UNSPECIFIED LATERALITY: ICD-10-CM

## 2018-06-10 DIAGNOSIS — J01.80 OTHER ACUTE SINUSITIS, RECURRENCE NOT SPECIFIED: ICD-10-CM

## 2018-06-10 PROCEDURE — 99214 OFFICE O/P EST MOD 30 MIN: CPT | Performed by: FAMILY MEDICINE

## 2018-06-10 RX ORDER — CEFDINIR 300 MG/1
600 CAPSULE ORAL DAILY
Qty: 20 CAP | Refills: 0 | Status: SHIPPED | OUTPATIENT
Start: 2018-06-10 | End: 2018-06-20

## 2018-06-10 NOTE — PROGRESS NOTES
HPI: Celena Phan is a 52 y.o. female who presents with   Chief Complaint   Patient presents with   • Sinus Problem     Almost a week stuffy nose , ears plugged and sorethroat .   Patient presents to urgent care with a new problem that has been present now for the past 8 days she's had ear congestion and sore throat and nasal congestion some mild dizziness with head movement. She denies fevers or chills is had some malaise no nausea vomiting or diarrhea. She has not been taking anything for her symptoms.     Worsened by: activity, laying supine at night, first thing in the morning, when exposed to outside allergens  Improved by: Rest    Review of Systems performed. All other systems are negative except for what is listed above.   PMH:  has a past medical history of Heart burn; Heart valve disease (1990); and Pain.  MEDS:   Current Outpatient Prescriptions:   •  cefdinir (OMNICEF) 300 MG Cap, Take 2 Caps by mouth every day for 10 days. With food, Disp: 20 Cap, Rfl: 0  •  NON SPECIFIED, 1 Each by Other route every day. For 1-2weeks 1) No furry creatures in bed  2) 6-8hours of sleep, as uninterrupted as possible, Disp: 1 Each, Rfl: 9999  •  estradiol-norethindrone (ACTIVELLA) 1-0.5 MG per tablet, Take 1 Tab by mouth every day., Disp: 90 Tab, Rfl: 0  •  B Complex Vitamins (VITAMIN B COMPLEX PO), Take 1 Tab by mouth every day., Disp: , Rfl:   •  POTASSIUM PO, Take 1 Tab by mouth every day. OTC - Supplement, Disp: , Rfl:   ALLERGIES:   Allergies   Allergen Reactions   • Erythromycin Itching     SURGHX:   Past Surgical History:   Procedure Laterality Date   • MIKO BY LAPAROSCOPY  9/28/2017    Procedure: MIKO BY LAPAROSCOPY;  Surgeon: Claudia Alford M.D.;  Location: SURGERY [facility];  Service: General   • KYPHOPLASTY  4/1/2010    Performed by SUSAN RIVERA at SURGERY [facility]     SOCHX:  reports that she has never smoked. She has never used smokeless tobacco. She reports that she does not drink  "alcohol or use drugs.  FH: Family history was reviewed, no pertinent findings to report    PE:  Vitals /68   Pulse 68   Temp 36.8 °C (98.2 °F)   Resp 16   Ht 1.753 m (5' 9\")   Wt 67.1 kg (148 lb)   LMP 03/25/2010   SpO2 99%   BMI 21.86 kg/m²    Gen AOx4, NAD  HEENT: moist mucus membranes,  pain and pressure with percussion of bilateral, maxillary  sinuses.  Bilateral conjunciva clear without erythema or exudate, Bilateral TM's with minimal fluid and erythema wihtout bulge, or loss of landmarks,mild pharyngeal erythema without tonsillar exudate or tonsillar enlargement  Neck: supple, no cervical lymphadenopathy, no signs of menigismus  CV/PULM: RRR no murmurs, no rales ronchi or wheezes, no signs of resp distress  Abd soft nontender, bs present  Skin no rashes  Extremities -c/c/e  Neuro appropriate affect,     A/P  1. Other acute sinusitis, recurrence not specified  cefdinir (OMNICEF) 300 MG Cap   2. BPPV (benign paroxysmal positional vertigo), unspecified laterality       Differential diagnosis, natural history, supportive care discussed. Follow-up with primary care provider within 7-10 days, emergency room precautions discussed.  Patient and/or family appears understanding of information.    "

## 2018-06-14 ENCOUNTER — APPOINTMENT (OUTPATIENT)
Dept: PHYSICAL THERAPY | Facility: REHABILITATION | Age: 53
End: 2018-06-14
Attending: FAMILY MEDICINE
Payer: COMMERCIAL

## 2018-07-03 ENCOUNTER — TELEPHONE (OUTPATIENT)
Dept: PHYSICAL THERAPY | Facility: REHABILITATION | Age: 53
End: 2018-07-03

## 2018-07-03 NOTE — OP THERAPY DISCHARGE SUMMARY
Outpatient Physical Therapy  DISCHARGE SUMMARY NOTE      [facility] [place] [address]  [address]  [place] [address]  Phone:  [phone]  Fax:  [phone]    Date of Visit: 07/03/2018    Patient: Celena Phan  YOB: 1965  MRN: [id number]     Referring Provider: Daryn Albarado M.D.  [address]  [address]  [place] [address]   Referring Diagnosis Benign paroxysmal positional vertigo of right ear [H81.11]     Physical Therapy Occurrence Codes    Date physical therapy care plan established or reviewed:  5/31/18   Date physical therapy treatment started:  5/31/18        Your patient is being discharged from Physical Therapy with the following comments:   · Goals met    Comments:  Ms. Phan was seen for 1 visit regarding her vertigo.  Symptoms were consistent with BPPV and treated with good success.  Pt was to return only if vertigo persisted, but she has not required follow up in greater than 30 days.     Recommendations:  Vertigo resolved with home management education provided.  D/C from skilled PT at this time.     Lola Joyce, PT, DPT    Date: 7/3/2018

## 2018-11-21 ENCOUNTER — APPOINTMENT (OUTPATIENT)
Dept: MEDICAL GROUP | Facility: MEDICAL CENTER | Age: 53
End: 2018-11-21
Payer: COMMERCIAL

## 2018-12-04 ENCOUNTER — TELEPHONE (OUTPATIENT)
Dept: MEDICAL GROUP | Facility: MEDICAL CENTER | Age: 53
End: 2018-12-04

## 2018-12-04 NOTE — TELEPHONE ENCOUNTER
VOICEMAIL  1. Caller Name: Celena Phan                        Call Back Number: [phone] (home) [phone] (work)      2. Message: Pt needs things clarified and asked for a call back.    (left vm to have her call back)    3. Patient approves office to leave a detailed voicemail/MyChart message: N\A

## 2018-12-04 NOTE — TELEPHONE ENCOUNTER
VOICEMAIL  1. Caller Name: Celena Phan                        Call Back Number: [phone] (home) [phone] (work)      2. Message: Pt called to have a PA done on her Estradiol-Norethindrone Acet 1-0.5MG OR TABS.    PA was done.       3. Patient approves office to leave a detailed voicemail/MyChart message: N\A

## 2018-12-05 NOTE — TELEPHONE ENCOUNTER
Can you please ask her to relay the message through you, or to sign up in my chart.  Wednesdays are typically very busy, and I do not have time to call back patients.

## 2018-12-10 ENCOUNTER — OFFICE VISIT (OUTPATIENT)
Dept: MEDICAL GROUP | Facility: MEDICAL CENTER | Age: 53
End: 2018-12-10
Payer: COMMERCIAL

## 2018-12-10 VITALS
OXYGEN SATURATION: 99 % | SYSTOLIC BLOOD PRESSURE: 110 MMHG | HEIGHT: 69 IN | HEART RATE: 81 BPM | BODY MASS INDEX: 22.54 KG/M2 | WEIGHT: 152.2 LBS | DIASTOLIC BLOOD PRESSURE: 60 MMHG | TEMPERATURE: 98 F

## 2018-12-10 DIAGNOSIS — K21.00 GASTROESOPHAGEAL REFLUX DISEASE WITH ESOPHAGITIS: ICD-10-CM

## 2018-12-10 DIAGNOSIS — R25.2 BILATERAL LEG CRAMPS: ICD-10-CM

## 2018-12-10 DIAGNOSIS — Z00.00 ANNUAL PHYSICAL EXAM: Primary | ICD-10-CM

## 2018-12-10 DIAGNOSIS — Z79.890 HORMONE REPLACEMENT THERAPY (POSTMENOPAUSAL): ICD-10-CM

## 2018-12-10 PROBLEM — H81.10 BPPV (BENIGN PAROXYSMAL POSITIONAL VERTIGO): Status: RESOLVED | Noted: 2018-05-17 | Resolved: 2018-12-10

## 2018-12-10 PROBLEM — R53.83 OTHER FATIGUE: Status: RESOLVED | Noted: 2018-05-17 | Resolved: 2018-12-10

## 2018-12-10 PROCEDURE — 99396 PREV VISIT EST AGE 40-64: CPT | Performed by: FAMILY MEDICINE

## 2018-12-10 RX ORDER — ESTRADIOL 1 MG/1
1 TABLET ORAL DAILY
Qty: 90 TAB | Refills: 3 | Status: SHIPPED | OUTPATIENT
Start: 2018-12-10 | End: 2019-03-08 | Stop reason: SDUPTHER

## 2018-12-13 NOTE — ASSESSMENT & PLAN NOTE
Acute exacerbation chronic condition, uncontrolled, patient concerned that she may be once again low on potassium.  Therefore, we will check electrolytes that are related to low potassium.    ROS is otherwise negative for ACS: dizziness, generalized weakness/fatigue, cold sweats,  vision/hearing changes, jaw pain/paresthesias, BUE pain/paresthesias/numbness/weakness, chest pain/pressure, palpitations, dyspnea, nausea, RUQ abdominal pain, oliguria/anuria, BLE edema.

## 2018-12-13 NOTE — PROGRESS NOTES
Subjective:   Chief Complaint/History of Present Illness:  Celena Phan is a 53 y.o. female established who presents today for Annual Medical exam, to review the following medical problems.      The primary encounter diagnosis was Annual physical exam. Diagnoses of Bilateral leg cramps, Hormone replacement therapy (postmenopausal), and Gastroesophageal reflux disease with esophagitis were also pertinent to this visit.    PMH, PSH, Social History, Medications, Allergies, FMH all reviewed as documented:    Bilateral leg cramps  Acute exacerbation chronic condition, uncontrolled, patient concerned that she may be once again low on potassium.  Therefore, we will check electrolytes that are related to low potassium.    ROS is otherwise negative for ACS: dizziness, generalized weakness/fatigue, cold sweats,  vision/hearing changes, jaw pain/paresthesias, BUE pain/paresthesias/numbness/weakness, chest pain/pressure, palpitations, dyspnea, nausea, RUQ abdominal pain, oliguria/anuria, BLE edema.     Hormone replacement therapy (postmenopausal)  Chronic, stable, well-controlled, taking medication as directed.     Patient is taking her HRT on a consistent basis, with no complaints regarding her post-menopausal vaginal bleeding.  She is not a smoker, and has been given precautions regarding long-distance travel or other long-term sedentary behavior.    ROS is fevers, chills, dizziness, lightheadedness, chest pain/pressure, tachycardia, tachypnea, dyspnea, respiratory distress, BLE paresthesias/pain/poikilothermia/pallor/paralysis, gait instability.    Gastroesophageal reflux disease with esophagitis  Chronic, stable, well-controlled, not taking any medications.        Patient Active Problem List    Diagnosis Date Noted   • Gastroesophageal reflux disease with esophagitis 12/10/2018   • Hormone replacement therapy (postmenopausal) 05/17/2018   • Bilateral leg cramps 05/17/2018   • Visual changes 05/17/2018  "      Additional History:   Allergies:    Erythromycin     Medications:     Current Outpatient Prescriptions Ordered in [facility]   Medication Sig Dispense Refill   • estradiol (ESTRACE) 1 MG Tab Take 1 Tab by mouth every day. 90 Tab 3   • NON SPECIFIED 1 Each by Other route every day. For 1-2weeks  1) No furry creatures in bed   2) 6-8hours of sleep, as uninterrupted as possible 1 Each 9999   • B Complex Vitamins (VITAMIN B COMPLEX PO) Take 1 Tab by mouth every day.     • POTASSIUM PO Take 1 Tab by mouth every day. OTC - Supplement       No current Epic-ordered facility-administered medications on file.         Past Medical History:     Past Medical History:   Diagnosis Date   • Heart burn    • Heart valve disease 1990    MVP   • Pain     back        Past Surgical History:     Past Surgical History:   Procedure Laterality Date   • MIKO BY LAPAROSCOPY  9/28/2017    Procedure: MIKO BY LAPAROSCOPY;  Surgeon: Claudia Alford M.D.;  Location: SURGERY [facility];  Service: General   • KYPHOPLASTY  4/1/2010    Performed by SUSAN RIVERA at SURGERY [facility]        Social History:     Social History   Substance Use Topics   • Smoking status: Never Smoker   • Smokeless tobacco: Never Used   • Alcohol use No      Comment: rarely        Family History:     No family status information on file.        Family History   Problem Relation Age of Onset   • Other Neg Hx        ROS:     - NOTE: All other systems reviewed and are negative, except as in HPI.     Objective:   Physical Exam:    Vitals: Blood pressure 110/60, pulse 81, temperature 36.7 °C (98 °F), height 1.753 m (5' 9.02\"), weight 69 kg (152 lb 3.2 oz), last menstrual period 03/25/2010, SpO2 99 %.   BMI: Body mass index is 22.47 kg/m².   General/Constitutional: Vitals as above, Well nourished, well developed female in no acute distress   Head/Eyes: Head is grossly normal & atraumatic, bilateral conjunctivae clear and not injected, bilateral EOMI, bilateral " PERRL   ENT: Bilateral external ears grossly normal in appearance, Hearing grossly intact, External nares normal in appearance and without discharge/bleeding   Respiratory: No respiratory distress, bilateral lungs are clear to ausculation in all lung fields (anterior/lateral/posterior), no wheezing/rhonchi/rales   Cardiovascular: Regular rate and rhythm without murmur/gallops/rubs, distal pulses are intact and equal bilaterally (radial, posterior tibial), no bilateral lower extremity edema   MSK: BLE with supple musculature with equal tone and non-tender to palpation/compression, and Gait grossly normal & not antalgic   Integumentary: No apparent rashes   Psych: Judgment grossly appropriate, no apparent depression/anxiety    Health Maintenance:     - Records for mammo + pap are requested    - 09/04/15 -- Colonoscopy WNL     Imaging/Labs:     - 12/27/17 -- Labs WNL (CBC, CMP, A1c, Lipid Profille)    Assessment and Plan:   1. Annual physical exam  Unknown control of metabolic health. Labs as below to evaluate.   - COMP METABOLIC PANEL; Future   - Lipid Profile; Future   - HEMOGLOBIN A1C; Future   - MAGNESIUM; Future   - PHOSPHORUS; Future    2. Bilateral leg cramps  Acute exacerbation of chronic condition, uncontrolled, evaluate with labs as below.     - COMP METABOLIC PANEL; Future   - MAGNESIUM; Future   - PHOSPHORUS; Future    3. Hormone replacement therapy (postmenopausal)  Chronic, stable, well-controlled.  Continue taking medication as directed.    - estradiol (ESTRACE) 1 MG Tab; Take 1 Tab by mouth every day.  Dispense: 90 Tab; Refill: 3   - COMP METABOLIC PANEL; Future    4. Gastroesophageal reflux disease with esophagitis  Chronic, stable, well-controlled.  Continue off medicatoin.   - COMP METABOLIC PANEL; Future      RTC: in 1year for Annual Medical Exam.    PLEASE NOTE: This dictation was created using voice recognition software. I have made every reasonable attempt to correct obvious errors, but I expect  that there are errors of grammar and possibly content that I did not discover before finalizing the note.

## 2018-12-13 NOTE — ASSESSMENT & PLAN NOTE
Chronic, stable, well-controlled, taking medication as directed.     Patient is taking her HRT on a consistent basis, with no complaints regarding her post-menopausal vaginal bleeding.  She is not a smoker, and has been given precautions regarding long-distance travel or other long-term sedentary behavior.    ROS is fevers, chills, dizziness, lightheadedness, chest pain/pressure, tachycardia, tachypnea, dyspnea, respiratory distress, BLE paresthesias/pain/poikilothermia/pallor/paralysis, gait instability.

## 2018-12-18 ENCOUNTER — TELEPHONE (OUTPATIENT)
Dept: MEDICAL GROUP | Facility: MEDICAL CENTER | Age: 53
End: 2018-12-18

## 2018-12-19 NOTE — TELEPHONE ENCOUNTER
Weren't we provided this list?  If it's not in the media section, please call insurance for a faxed list.  We can then provide a copy to the patient, which she can then bring to Dr. Rubio.

## 2018-12-19 NOTE — TELEPHONE ENCOUNTER
VOICEMAIL  1. Caller Name: Celena Phan                        Call Back Number: [phone] (home) [phone] (work)      2. Message: Pt is calling to request the list of estrogen insurance sent to us to be sent to Dr. Rascon. Fax # [phone]     3. Patient approves office to leave a detailed voicemail/MyChart message: N\A

## 2018-12-21 NOTE — TELEPHONE ENCOUNTER
I'm currently out of the office.  Can you please do me a favor, and ask the insurance for that list, once more?  Once you have it, can you please scan it in, then provide a copy to the patient so she can bring it to Dr. Rubio?

## 2019-02-05 ENCOUNTER — HOSPITAL ENCOUNTER (OUTPATIENT)
Dept: RADIOLOGY | Facility: MEDICAL CENTER | Age: 54
End: 2019-02-05
Attending: NURSE PRACTITIONER
Payer: COMMERCIAL

## 2019-02-05 DIAGNOSIS — R13.10 PROBLEMS WITH SWALLOWING AND MASTICATION: ICD-10-CM

## 2019-02-05 PROCEDURE — 700101 HCHG RX REV CODE 250: Performed by: NURSE PRACTITIONER

## 2019-02-05 PROCEDURE — 74220 X-RAY XM ESOPHAGUS 1CNTRST: CPT

## 2019-02-05 RX ADMIN — BARIUM SULFATE 700 MG: 700 TABLET ORAL at 09:27

## 2019-02-08 ENCOUNTER — HOSPITAL ENCOUNTER (OUTPATIENT)
Dept: LAB | Facility: MEDICAL CENTER | Age: 54
End: 2019-02-08
Attending: FAMILY MEDICINE
Payer: COMMERCIAL

## 2019-02-08 DIAGNOSIS — Z00.00 ANNUAL PHYSICAL EXAM: ICD-10-CM

## 2019-02-08 DIAGNOSIS — K21.00 GASTROESOPHAGEAL REFLUX DISEASE WITH ESOPHAGITIS: ICD-10-CM

## 2019-02-08 DIAGNOSIS — Z79.890 HORMONE REPLACEMENT THERAPY (POSTMENOPAUSAL): ICD-10-CM

## 2019-02-08 DIAGNOSIS — R25.2 BILATERAL LEG CRAMPS: ICD-10-CM

## 2019-02-08 LAB
ALBUMIN SERPL BCP-MCNC: 4.3 G/DL (ref 3.2–4.9)
ALBUMIN/GLOB SERPL: 1.6 G/DL
ALP SERPL-CCNC: 34 U/L (ref 30–99)
ALT SERPL-CCNC: 27 U/L (ref 2–50)
ANION GAP SERPL CALC-SCNC: 6 MMOL/L (ref 0–11.9)
AST SERPL-CCNC: 24 U/L (ref 12–45)
BILIRUB SERPL-MCNC: 0.7 MG/DL (ref 0.1–1.5)
BUN SERPL-MCNC: 18 MG/DL (ref 8–22)
CALCIUM SERPL-MCNC: 9.7 MG/DL (ref 8.5–10.5)
CHLORIDE SERPL-SCNC: 107 MMOL/L (ref 96–112)
CHOLEST SERPL-MCNC: 180 MG/DL (ref 100–199)
CO2 SERPL-SCNC: 26 MMOL/L (ref 20–33)
CREAT SERPL-MCNC: 0.67 MG/DL (ref 0.5–1.4)
EST. AVERAGE GLUCOSE BLD GHB EST-MCNC: 123 MG/DL
FASTING STATUS PATIENT QL REPORTED: NORMAL
GLOBULIN SER CALC-MCNC: 2.7 G/DL (ref 1.9–3.5)
GLUCOSE SERPL-MCNC: 101 MG/DL (ref 65–99)
HBA1C MFR BLD: 5.9 % (ref 0–5.6)
HDLC SERPL-MCNC: 61 MG/DL
LDLC SERPL CALC-MCNC: 108 MG/DL
MAGNESIUM SERPL-MCNC: 2.1 MG/DL (ref 1.5–2.5)
PHOSPHATE SERPL-MCNC: 3.6 MG/DL (ref 2.5–4.5)
POTASSIUM SERPL-SCNC: 4 MMOL/L (ref 3.6–5.5)
PROT SERPL-MCNC: 7 G/DL (ref 6–8.2)
SODIUM SERPL-SCNC: 139 MMOL/L (ref 135–145)
TRIGL SERPL-MCNC: 56 MG/DL (ref 0–149)

## 2019-02-08 PROCEDURE — 83735 ASSAY OF MAGNESIUM: CPT

## 2019-02-08 PROCEDURE — 36415 COLL VENOUS BLD VENIPUNCTURE: CPT

## 2019-02-08 PROCEDURE — 80053 COMPREHEN METABOLIC PANEL: CPT

## 2019-02-08 PROCEDURE — 83036 HEMOGLOBIN GLYCOSYLATED A1C: CPT

## 2019-02-08 PROCEDURE — 80061 LIPID PANEL: CPT

## 2019-02-08 PROCEDURE — 84100 ASSAY OF PHOSPHORUS: CPT

## 2019-02-11 ENCOUNTER — TELEPHONE (OUTPATIENT)
Dept: MEDICAL GROUP | Facility: MEDICAL CENTER | Age: 54
End: 2019-02-11

## 2019-02-11 ENCOUNTER — HOSPITAL ENCOUNTER (OUTPATIENT)
Facility: MEDICAL CENTER | Age: 54
End: 2019-02-11
Attending: NURSE PRACTITIONER
Payer: COMMERCIAL

## 2019-02-11 PROCEDURE — 87338 HPYLORI STOOL AG IA: CPT

## 2019-02-11 NOTE — TELEPHONE ENCOUNTER
Phone Number Called: [phone] (home) [phone] (work)     Message: Left message for patient to call us back for lab results.     Left Message for patient to call back: yes

## 2019-02-11 NOTE — TELEPHONE ENCOUNTER
----- Message from Daryn Albarado M.D. sent at 2/10/2019  3:56 PM PST -----  MA to call patient to relate the following:     - Prediabetes, elevated LDL (bad) cholesterol, and other labs are normal     - If patient would like to discuss them in further detail, we can discuss this at clinic visit on 12/10/19@9am.

## 2019-02-12 LAB — H PYLORI AG STL QL IA: NOT DETECTED

## 2019-02-27 ENCOUNTER — HOSPITAL ENCOUNTER (OUTPATIENT)
Facility: MEDICAL CENTER | Age: 54
End: 2019-02-27
Attending: INTERNAL MEDICINE | Admitting: INTERNAL MEDICINE
Payer: COMMERCIAL

## 2019-02-27 VITALS
HEART RATE: 68 BPM | DIASTOLIC BLOOD PRESSURE: 56 MMHG | OXYGEN SATURATION: 100 % | HEIGHT: 69 IN | WEIGHT: 146.16 LBS | SYSTOLIC BLOOD PRESSURE: 107 MMHG | TEMPERATURE: 98.1 F | RESPIRATION RATE: 16 BRPM | BODY MASS INDEX: 21.65 KG/M2

## 2019-02-27 PROCEDURE — 160048 HCHG OR STATISTICAL LEVEL 1-5: Performed by: INTERNAL MEDICINE

## 2019-02-27 PROCEDURE — 160002 HCHG RECOVERY MINUTES (STAT): Performed by: INTERNAL MEDICINE

## 2019-02-27 PROCEDURE — 91010 ESOPHAGUS MOTILITY STUDY: CPT | Performed by: INTERNAL MEDICINE

## 2019-02-27 PROCEDURE — 700101 HCHG RX REV CODE 250

## 2019-02-27 NOTE — DISCHARGE INSTRUCTIONS
Esophageal Manometry - Discharge Instructions    Manometry  1.  Resume regular diet and medications.  2.  Follow up with your doctor.  3.  Additional instructions:     You should call 911 if you develop problems with breathing or chest pain.  If any questions arise, call your doctor. If your doctor is not available, please feel free to call [phone]. You can also call the [facility] open 24 hours/day, 7 days/week and speak to a nurse at [phone], or toll free [phone].    I acknowledge receipt and understanding of these Home Care Instructions.

## 2019-02-27 NOTE — PROGRESS NOTES
1025-Pt in PACU from endo. Pt alert and oriented. VSS. Respirations even and unlabored. Tolerating sips of water.     1033-Pt discharged home, ambulatory with steady gait.

## 2019-03-04 NOTE — TELEPHONE ENCOUNTER
Phone Number Called: [phone] (home) [phone] (work)     Message: Left message for patient to call us back for labs.     Left Message for patient to call back: yes

## 2019-03-08 DIAGNOSIS — Z79.890 HORMONE REPLACEMENT THERAPY (POSTMENOPAUSAL): ICD-10-CM

## 2019-03-08 RX ORDER — ESTRADIOL 1 MG/1
1 TABLET ORAL DAILY
Qty: 90 TAB | Refills: 2 | Status: SHIPPED | OUTPATIENT
Start: 2019-03-08 | End: 2021-02-04

## 2019-03-08 NOTE — TELEPHONE ENCOUNTER
90 day supply  Was the patient seen in the last year in this department? Yes    Does patient have an active prescription for medications requested? Yes    Received Request Via: Pharmacy

## 2019-11-25 ENCOUNTER — HOSPITAL ENCOUNTER (OUTPATIENT)
Dept: RADIOLOGY | Facility: MEDICAL CENTER | Age: 54
End: 2019-11-25
Attending: FAMILY MEDICINE
Payer: COMMERCIAL

## 2019-11-25 DIAGNOSIS — Z79.890 POSTMENOPAUSAL HORMONE THERAPY: ICD-10-CM

## 2019-11-25 PROCEDURE — 76830 TRANSVAGINAL US NON-OB: CPT

## 2019-12-23 ENCOUNTER — HOSPITAL ENCOUNTER (OUTPATIENT)
Dept: LAB | Facility: MEDICAL CENTER | Age: 54
End: 2019-12-23
Attending: FAMILY MEDICINE
Payer: COMMERCIAL

## 2019-12-23 LAB
ALBUMIN SERPL BCP-MCNC: 4.3 G/DL (ref 3.2–4.9)
ALBUMIN/GLOB SERPL: 1.6 G/DL
ALP SERPL-CCNC: 34 U/L (ref 30–99)
ALT SERPL-CCNC: 19 U/L (ref 2–50)
ANION GAP SERPL CALC-SCNC: 6 MMOL/L (ref 0–11.9)
AST SERPL-CCNC: 21 U/L (ref 12–45)
BASOPHILS # BLD AUTO: 1.6 % (ref 0–1.8)
BASOPHILS # BLD: 0.05 K/UL (ref 0–0.12)
BILIRUB SERPL-MCNC: 0.6 MG/DL (ref 0.1–1.5)
BUN SERPL-MCNC: 20 MG/DL (ref 8–22)
CALCIUM SERPL-MCNC: 9.4 MG/DL (ref 8.5–10.5)
CHLORIDE SERPL-SCNC: 106 MMOL/L (ref 96–112)
CHOLEST SERPL-MCNC: 186 MG/DL (ref 100–199)
CO2 SERPL-SCNC: 27 MMOL/L (ref 20–33)
CREAT SERPL-MCNC: 0.71 MG/DL (ref 0.5–1.4)
EOSINOPHIL # BLD AUTO: 0.08 K/UL (ref 0–0.51)
EOSINOPHIL NFR BLD: 2.5 % (ref 0–6.9)
ERYTHROCYTE [DISTWIDTH] IN BLOOD BY AUTOMATED COUNT: 45.1 FL (ref 35.9–50)
EST. AVERAGE GLUCOSE BLD GHB EST-MCNC: 111 MG/DL
FASTING STATUS PATIENT QL REPORTED: NORMAL
GLOBULIN SER CALC-MCNC: 2.7 G/DL (ref 1.9–3.5)
GLUCOSE SERPL-MCNC: 96 MG/DL (ref 65–99)
HBA1C MFR BLD: 5.5 % (ref 0–5.6)
HCT VFR BLD AUTO: 44.8 % (ref 37–47)
HDLC SERPL-MCNC: 64 MG/DL
HGB BLD-MCNC: 14.5 G/DL (ref 12–16)
IMM GRANULOCYTES # BLD AUTO: 0.01 K/UL (ref 0–0.11)
IMM GRANULOCYTES NFR BLD AUTO: 0.3 % (ref 0–0.9)
LDLC SERPL CALC-MCNC: 111 MG/DL
LYMPHOCYTES # BLD AUTO: 0.95 K/UL (ref 1–4.8)
LYMPHOCYTES NFR BLD: 30.2 % (ref 22–41)
MCH RBC QN AUTO: 31.1 PG (ref 27–33)
MCHC RBC AUTO-ENTMCNC: 32.4 G/DL (ref 33.6–35)
MCV RBC AUTO: 96.1 FL (ref 81.4–97.8)
MONOCYTES # BLD AUTO: 0.21 K/UL (ref 0–0.85)
MONOCYTES NFR BLD AUTO: 6.7 % (ref 0–13.4)
NEUTROPHILS # BLD AUTO: 1.85 K/UL (ref 2–7.15)
NEUTROPHILS NFR BLD: 58.7 % (ref 44–72)
NRBC # BLD AUTO: 0 K/UL
NRBC BLD-RTO: 0 /100 WBC
PLATELET # BLD AUTO: 190 K/UL (ref 164–446)
PMV BLD AUTO: 10.7 FL (ref 9–12.9)
POTASSIUM SERPL-SCNC: 3.9 MMOL/L (ref 3.6–5.5)
PROT SERPL-MCNC: 7 G/DL (ref 6–8.2)
RBC # BLD AUTO: 4.66 M/UL (ref 4.2–5.4)
SODIUM SERPL-SCNC: 139 MMOL/L (ref 135–145)
TRIGL SERPL-MCNC: 55 MG/DL (ref 0–149)
TSH SERPL DL<=0.005 MIU/L-ACNC: 2.92 UIU/ML (ref 0.38–5.33)
WBC # BLD AUTO: 3.2 K/UL (ref 4.8–10.8)

## 2019-12-23 PROCEDURE — 36415 COLL VENOUS BLD VENIPUNCTURE: CPT

## 2019-12-23 PROCEDURE — 83036 HEMOGLOBIN GLYCOSYLATED A1C: CPT

## 2019-12-23 PROCEDURE — 80053 COMPREHEN METABOLIC PANEL: CPT

## 2019-12-23 PROCEDURE — 84443 ASSAY THYROID STIM HORMONE: CPT

## 2019-12-23 PROCEDURE — 85025 COMPLETE CBC W/AUTO DIFF WBC: CPT

## 2019-12-23 PROCEDURE — 80061 LIPID PANEL: CPT

## 2020-01-06 ENCOUNTER — TELEPHONE (OUTPATIENT)
Dept: CARDIOLOGY | Facility: MEDICAL CENTER | Age: 55
End: 2020-01-06

## 2020-01-06 NOTE — TELEPHONE ENCOUNTER
Called [phone] (home) spoke to patient and she states that she had not been to any Cardiologist since Dr. Infante about 5 years ago. All past and recent labs, imaging, and EKG are in [facility]. Patient is aware of appointment with [facility] date, time, and office location. Patient was advised to not wear any lotion or oils on chest area for EKG.

## 2020-01-17 ENCOUNTER — HOSPITAL ENCOUNTER (OUTPATIENT)
Dept: LAB | Facility: MEDICAL CENTER | Age: 55
End: 2020-01-17
Attending: PHYSICIAN ASSISTANT
Payer: COMMERCIAL

## 2020-01-17 LAB — CYTOLOGY REG CYTOL: NORMAL

## 2020-01-17 PROCEDURE — 88175 CYTOPATH C/V AUTO FLUID REDO: CPT

## 2020-01-27 ENCOUNTER — OFFICE VISIT (OUTPATIENT)
Dept: CARDIOLOGY | Facility: MEDICAL CENTER | Age: 55
End: 2020-01-27
Payer: COMMERCIAL

## 2020-01-27 VITALS
HEIGHT: 69 IN | BODY MASS INDEX: 21.48 KG/M2 | SYSTOLIC BLOOD PRESSURE: 110 MMHG | HEART RATE: 84 BPM | OXYGEN SATURATION: 98 % | DIASTOLIC BLOOD PRESSURE: 62 MMHG | WEIGHT: 145 LBS

## 2020-01-27 DIAGNOSIS — R00.2 PALPITATIONS: ICD-10-CM

## 2020-01-27 LAB — EKG IMPRESSION: NORMAL

## 2020-01-27 PROCEDURE — 99204 OFFICE O/P NEW MOD 45 MIN: CPT | Performed by: INTERNAL MEDICINE

## 2020-01-27 PROCEDURE — 93000 ELECTROCARDIOGRAM COMPLETE: CPT | Performed by: INTERNAL MEDICINE

## 2020-01-27 RX ORDER — TRAMADOL HYDROCHLORIDE 50 MG/1
TABLET ORAL
COMMUNITY
Start: 2019-12-16 | End: 2020-01-27

## 2020-01-27 RX ORDER — FAMCICLOVIR 500 MG/1
TABLET ORAL
COMMUNITY
Start: 2019-11-07 | End: 2020-01-27

## 2020-01-27 RX ORDER — CEPHALEXIN 250 MG/1
CAPSULE ORAL
COMMUNITY
Start: 2019-11-21 | End: 2020-01-27

## 2020-01-27 RX ORDER — NEOMYCIN SULFATE, POLYMYXIN B SULFATE AND DEXAMETHASONE 3.5; 10000; 1 MG/ML; [USP'U]/ML; MG/ML
SUSPENSION/ DROPS OPHTHALMIC
COMMUNITY
Start: 2019-11-18 | End: 2020-01-27

## 2020-01-27 RX ORDER — NEOMYCIN SULFATE, POLYMYXIN B SULFATE, AND DEXAMETHASONE 3.5; 10000; 1 MG/G; [USP'U]/G; MG/G
OINTMENT OPHTHALMIC
COMMUNITY
Start: 2019-11-04 | End: 2020-01-27

## 2020-01-27 RX ORDER — ESTRADIOL 1 MG/1
TABLET ORAL
COMMUNITY
Start: 2018-12-10 | End: 2020-01-27

## 2020-01-27 ASSESSMENT — ENCOUNTER SYMPTOMS
LOSS OF CONSCIOUSNESS: 0
DIZZINESS: 1
SHORTNESS OF BREATH: 0
ABDOMINAL PAIN: 0
PND: 0
DEPRESSION: 0
ORTHOPNEA: 0
FALLS: 0
PALPITATIONS: 1

## 2020-01-27 NOTE — PROGRESS NOTES
Chief Complaint   Patient presents with   • Palpitations     NP       Subjective:   Celena Phan is a 54 y.o. female self-referred for management of palpitations.    Patient was seen by Dr. Infante in 2012 at which time she had reported being diagnosed with mitral valve prolapse at the age of 21.  She underwent an echocardiogram which is detailed below and did not show evidence of mitral valve prolapse.    Patient reports that since 2012 she has had intermittent palpitations about once or twice a week.  In the past 3 months or so she has had daily symptoms that can occur without any clear triggers, usually when she is under some sort of stress.  Symptoms usually resolve within couple of minutes, no more than 5 minutes.  She denies any associated dizziness, diaphoresis or dyspnea.    She is also noticed that in the morning when she uses her stationary bicycle, she gets lightheaded after she has finished her workout and stands up.  This does not happen in the evenings.      She tries to stay hydrated.  Drinks about 48 ounces of water a day.  Denies any caffeine or alcohol use.    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  Reports being under a lot of stress.    Past Medical History:   Diagnosis Date   • Heart burn    • Heart valve disease 1990    MVP   • Pain     back     Past Surgical History:   Procedure Laterality Date   • ESOPHAGEAL MOTILITY OR MANOMETRY N/A 2/27/2019    Procedure: ESOPHAGEAL MOTILITY OR MANOMETRY;  Surgeon: Rip Chambers M.D.;  Location: ENDOSCOPY [facility];  Service: Gastroenterology   • MIKO BY LAPAROSCOPY  9/28/2017    Procedure: MIKO BY LAPAROSCOPY;  Surgeon: Claudia Alford M.D.;  Location: SURGERY [facility];  Service: General   • OTHER ABDOMINAL SURGERY  09/2016    gallbladder removal   • OTHER ORTHOPEDIC SURGERY  04/2012    Kyphoplasty   • KYPHOPLASTY  4/1/2010    Performed by SUSAN RIVERA at SURGERY [facility]     Family History    Problem Relation Age of Onset   • Other Neg Hx      Social History     Socioeconomic History   • Marital status:      Spouse name: Not on file   • Number of children: Not on file   • Years of education: Not on file   • Highest education level: Not on file   Occupational History   • Not on file   Social Needs   • Financial resource strain: Not on file   • Food insecurity:     Worry: Not on file     Inability: Not on file   • Transportation needs:     Medical: Not on file     Non-medical: Not on file   Tobacco Use   • Smoking status: Never Smoker   • Smokeless tobacco: Never Used   Substance and Sexual Activity   • Alcohol use: No     Comment: rarely   • Drug use: No   • Sexual activity: Yes     Partners: Male   Lifestyle   • Physical activity:     Days per week: Not on file     Minutes per session: Not on file   • Stress: Not on file   Relationships   • Social connections:     Talks on phone: Not on file     Gets together: Not on file     Attends Jewish service: Not on file     Active member of club or organization: Not on file     Attends meetings of clubs or organizations: Not on file     Relationship status: Not on file   • Intimate partner violence:     Fear of current or ex partner: Not on file     Emotionally abused: Not on file     Physically abused: Not on file     Forced sexual activity: Not on file   Other Topics Concern   • Not on file   Social History Narrative   • Not on file     Allergies   Allergen Reactions   • Erythromycin Itching     Outpatient Encounter Medications as of 1/27/2020   Medication Sig Dispense Refill   • estradiol (ESTRACE) 1 MG Tab Take 1 Tab by mouth every day. 90 Tab 2   • Calcium-Magnesium 100-50 MG Tab Take  by mouth.     • Multiple Vitamins-Minerals (VITAMIN D3 COMPLETE PO) Take  by mouth.     • B Complex Vitamins (VITAMIN B COMPLEX PO) Take 1 Tab by mouth every day.     • POTASSIUM PO Take 1 Tab by mouth every day. OTC - Supplement     • [DISCONTINUED] estradiol  "(ESTRACE) 1 MG Tab TAKE 1 TABLET BY MOUTH EVERY DAY     • [DISCONTINUED] tramadol (ULTRAM) 50 MG Tab      • [DISCONTINUED] neomycin-polymyxin-dexamethasone (MAXITROL) 3.5-16540-8.1 Suspension ophthalmic suspension      • [DISCONTINUED] neomycin-polymixin-dexamethasone (MAXITROL) 3.5-34529-5.1 Ointment ophthalmic ointment      • [DISCONTINUED] famciclovir (FAMVIR) 500 MG Tab      • [DISCONTINUED] cephALEXin (KEFLEX) 250 MG Cap        No facility-administered encounter medications on file as of 1/27/2020.      Review of Systems   Constitutional: Negative for malaise/fatigue.   Respiratory: Negative for shortness of breath.    Cardiovascular: Positive for palpitations. Negative for chest pain, orthopnea, leg swelling and PND.   Gastrointestinal: Negative for abdominal pain.   Musculoskeletal: Negative for falls.   Neurological: Positive for dizziness. Negative for loss of consciousness.   Psychiatric/Behavioral: Negative for depression.   All other systems reviewed and are negative.       Objective:   /62 (BP Location: Left arm, Patient Position: Sitting, BP Cuff Size: Adult)   Pulse 84   Ht 1.753 m (5' 9\")   Wt 65.8 kg (145 lb)   LMP 03/25/2010   SpO2 98%   BMI 21.41 kg/m²     Physical Exam   Constitutional: She is oriented to person, place, and time. She appears well-developed and well-nourished. No distress.   HENT:   Head: Normocephalic and atraumatic.   Eyes: Conjunctivae are normal. No scleral icterus.   Neck: Normal range of motion. Neck supple.   Cardiovascular: Normal rate, regular rhythm and normal heart sounds. Exam reveals no gallop and no friction rub.   No murmur heard.  Pulmonary/Chest: Effort normal and breath sounds normal. No respiratory distress. She has no wheezes. She has no rales.   Abdominal: Soft. She exhibits no distension. There is no tenderness.   Musculoskeletal:         General: No edema.   Neurological: She is alert and oriented to person, place, and time.   Skin: Skin is warm " and dry. She is not diaphoretic.   Psychiatric: She has a normal mood and affect. Her behavior is normal.   Nursing note and vitals reviewed.    Echocardiogram performed in 2013.  Report was reviewed and did not show any evidence of mitral valve prolapse.  Images were not available for my review.  Normal LV systolic function.    Labs performed in December 2019 were reviewed and showed normal hemoglobin and creatinine.  .  Normal TSH    EKG performed today was personally reviewed and per my interpretation shows sinus rhythm in the 60s.    Assessment:     1. Palpitations  EKG    Holter Monitor / Event Recorder     Medical Decision Making:  Today's Assessment / Status / Plan:     Palpitations: Patient be referred for a ZIO patch monitor to evaluate for any arrhythmias.  I initially offered a Holter monitor however patient reported that on occasion she has had couple of days without any symptoms.  She should continue to stay hydrated.    In regard to the dizziness, it is likely secondary to dehydration given orthostatic-like symptoms.  Patient should try to stay hydrated especially during exercise.    Return to clinic in 2 months or earlier if needed.    Thank you for allowing me to participate in the care of this patient. Please do not hesitate to contact me with any questions.    Luba Alexander MD, [facility]  Cardiologist  [facility]    PLEASE NOTE: This dictation was created using voice recognition software.

## 2020-01-27 NOTE — LETTER
[facility]   [address] [place] [address]  KIM Freedman [address]  Phone: [phone]  Fax: [phone]              Celena Phan  1965    Encounter Date: 1/27/2020    Luba Alexander M.D.          PROGRESS NOTE:  Chief Complaint   Patient presents with   • Palpitations     NP       Subjective:   Celena Phan is a 54 y.o. female self-referred for management of palpitations.    Patient was seen by Dr. Infante in 2012 at which time she had reported being diagnosed with mitral valve prolapse at the age of 21.  She underwent an echocardiogram which is detailed below and did not show evidence of mitral valve prolapse.    Patient reports that since 2012 she has had intermittent palpitations about once or twice a week.  In the past 3 months or so she has had daily symptoms that can occur without any clear triggers, usually when she is under some sort of stress.  Symptoms usually resolve within couple of minutes, no more than 5 minutes.  She denies any associated dizziness, diaphoresis or dyspnea.    She is also noticed that in the morning when she uses her stationary bicycle, she gets lightheaded after she has finished her workout and stands up.  This does not happen in the evenings.      She tries to stay hydrated.  Drinks about 48 ounces of water a day.  Denies any caffeine or alcohol use.    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  Reports being under a lot of stress.    Past Medical History:   Diagnosis Date   • Heart burn    • Heart valve disease 1990    MVP   • Pain     back     Past Surgical History:   Procedure Laterality Date   • ESOPHAGEAL MOTILITY OR MANOMETRY N/A 2/27/2019    Procedure: ESOPHAGEAL MOTILITY OR MANOMETRY;  Surgeon: Rip Chambers M.D.;  Location: ENDOSCOPY [facility] [place];  Service: Gastroenterology   • MIKO BY LAPAROSCOPY  9/28/2017    Procedure: MIKO BY LAPAROSCOPY;  Surgeon: Claudia Alford M.D.;   Location: SURGERY [facility];  Service: General   • OTHER ABDOMINAL SURGERY  09/2016    gallbladder removal   • OTHER ORTHOPEDIC SURGERY  04/2012    Kyphoplasty   • KYPHOPLASTY  4/1/2010    Performed by SUSAN RIVERA at SURGERY [facility]     Family History   Problem Relation Age of Onset   • Other Neg Hx      Social History     Socioeconomic History   • Marital status:      Spouse name: Not on file   • Number of children: Not on file   • Years of education: Not on file   • Highest education level: Not on file   Occupational History   • Not on file   Social Needs   • Financial resource strain: Not on file   • Food insecurity:     Worry: Not on file     Inability: Not on file   • Transportation needs:     Medical: Not on file     Non-medical: Not on file   Tobacco Use   • Smoking status: Never Smoker   • Smokeless tobacco: Never Used   Substance and Sexual Activity   • Alcohol use: No     Comment: rarely   • Drug use: No   • Sexual activity: Yes     Partners: Male   Lifestyle   • Physical activity:     Days per week: Not on file     Minutes per session: Not on file   • Stress: Not on file   Relationships   • Social connections:     Talks on phone: Not on file     Gets together: Not on file     Attends [employer] service: Not on file     Active member of club or organization: Not on file     Attends meetings of clubs or organizations: Not on file     Relationship status: Not on file   • Intimate partner violence:     Fear of current or ex partner: Not on file     Emotionally abused: Not on file     Physically abused: Not on file     Forced sexual activity: Not on file   Other Topics Concern   • Not on file   Social History Narrative   • Not on file     Allergies   Allergen Reactions   • Erythromycin Itching     Outpatient Encounter Medications as of 1/27/2020   Medication Sig Dispense Refill   • estradiol (ESTRACE) 1 MG Tab Take 1 Tab by mouth every day. 90 Tab 2   • Calcium-Magnesium 100-50 MG Tab  "Take  by mouth.     • Multiple Vitamins-Minerals (VITAMIN D3 COMPLETE PO) Take  by mouth.     • B Complex Vitamins (VITAMIN B COMPLEX PO) Take 1 Tab by mouth every day.     • POTASSIUM PO Take 1 Tab by mouth every day. OTC - Supplement     • [DISCONTINUED] estradiol (ESTRACE) 1 MG Tab TAKE 1 TABLET BY MOUTH EVERY DAY     • [DISCONTINUED] tramadol (ULTRAM) 50 MG Tab      • [DISCONTINUED] neomycin-polymyxin-dexamethasone (MAXITROL) 3.5-98756-8.1 Suspension ophthalmic suspension      • [DISCONTINUED] neomycin-polymixin-dexamethasone (MAXITROL) 3.5-05643-9.1 Ointment ophthalmic ointment      • [DISCONTINUED] famciclovir (FAMVIR) 500 MG Tab      • [DISCONTINUED] cephALEXin (KEFLEX) 250 MG Cap        No facility-administered encounter medications on file as of 1/27/2020.      Review of Systems   Constitutional: Negative for malaise/fatigue.   Respiratory: Negative for shortness of breath.    Cardiovascular: Positive for palpitations. Negative for chest pain, orthopnea, leg swelling and PND.   Gastrointestinal: Negative for abdominal pain.   Musculoskeletal: Negative for falls.   Neurological: Positive for dizziness. Negative for loss of consciousness.   Psychiatric/Behavioral: Negative for depression.   All other systems reviewed and are negative.       Objective:   /62 (BP Location: Left arm, Patient Position: Sitting, BP Cuff Size: Adult)   Pulse 84   Ht 1.753 m (5' 9\")   Wt 65.8 kg (145 lb)   LMP 03/25/2010   SpO2 98%   BMI 21.41 kg/m²      Physical Exam   Constitutional: She is oriented to person, place, and time. She appears well-developed and well-nourished. No distress.   HENT:   Head: Normocephalic and atraumatic.   Eyes: Conjunctivae are normal. No scleral icterus.   Neck: Normal range of motion. Neck supple.   Cardiovascular: Normal rate, regular rhythm and normal heart sounds. Exam reveals no gallop and no friction rub.   No murmur heard.  Pulmonary/Chest: Effort normal and breath sounds normal. No " respiratory distress. She has no wheezes. She has no rales.   Abdominal: Soft. She exhibits no distension. There is no tenderness.   Musculoskeletal:         General: No edema.   Neurological: She is alert and oriented to person, place, and time.   Skin: Skin is warm and dry. She is not diaphoretic.   Psychiatric: She has a normal mood and affect. Her behavior is normal.   Nursing note and vitals reviewed.    Echocardiogram performed in 2013.  Report was reviewed and did not show any evidence of mitral valve prolapse.  Images were not available for my review.  Normal LV systolic function.    Labs performed in December 2019 were reviewed and showed normal hemoglobin and creatinine.  .  Normal TSH    EKG performed today was personally reviewed and per my interpretation shows sinus rhythm in the 60s.    Assessment:     1. Palpitations  EKG    Holter Monitor / Event Recorder     Medical Decision Making:  Today's Assessment / Status / Plan:     Palpitations: Patient be referred for a ZIO patch monitor to evaluate for any arrhythmias.  I initially offered a Holter monitor however patient reported that on occasion she has had couple of days without any symptoms.  She should continue to stay hydrated.    In regard to the dizziness, it is likely secondary to dehydration given orthostatic-like symptoms.  Patient should try to stay hydrated especially during exercise.    Return to clinic in 2 months or earlier if needed.    Thank you for allowing me to participate in the care of this patient. Please do not hesitate to contact me with any questions.    Luba Alexander MD, [facility]  Cardiologist  [facility]    PLEASE NOTE: This dictation was created using voice recognition software.             Daryn Albarado M.D.  [address]  [address]  [place] [address]  VIA In Basket

## 2020-02-10 DIAGNOSIS — Z79.890 HORMONE REPLACEMENT THERAPY (POSTMENOPAUSAL): ICD-10-CM

## 2020-02-10 RX ORDER — ESTRADIOL 1 MG/1
TABLET ORAL
Qty: 90 TAB | OUTPATIENT
Start: 2020-02-10

## 2020-02-10 NOTE — TELEPHONE ENCOUNTER
Received request via: Pharmacy    Was the patient seen in the last year in this department? No     Does the patient have an active prescription (recently filled or refills available) for medication(s) requested? No

## 2020-02-18 ENCOUNTER — NON-PROVIDER VISIT (OUTPATIENT)
Dept: CARDIOLOGY | Facility: MEDICAL CENTER | Age: 55
End: 2020-02-18
Payer: COMMERCIAL

## 2020-02-18 ENCOUNTER — TELEPHONE (OUTPATIENT)
Dept: CARDIOLOGY | Facility: MEDICAL CENTER | Age: 55
End: 2020-02-18

## 2020-02-18 DIAGNOSIS — R00.2 PALPITATIONS: ICD-10-CM

## 2020-02-18 DIAGNOSIS — I47.10 SVT (SUPRAVENTRICULAR TACHYCARDIA) (HCC): ICD-10-CM

## 2020-03-10 PROCEDURE — 0296T PR EXT ECG > 48HR TO 21 DAY RCRD W/CONECT INTL RCRD: CPT | Performed by: INTERNAL MEDICINE

## 2020-03-10 PROCEDURE — 0298T PR EXT ECG > 48HR TO 21 DAY REVIEW AND INTERPRETATN: CPT | Performed by: INTERNAL MEDICINE

## 2020-03-17 ENCOUNTER — TELEPHONE (OUTPATIENT)
Dept: CARDIOLOGY | Facility: MEDICAL CENTER | Age: 55
End: 2020-03-17

## 2020-03-17 NOTE — TELEPHONE ENCOUNTER
AA pt requesting zio results, she canceled tomorrow's appt & would rather get results over the phone. #[phone]

## 2020-03-18 NOTE — TELEPHONE ENCOUNTER
Results were sent to AA and currently awaiting recommendations if any.    Attempted to call pt, no answer, LM for her to call back.

## 2020-04-01 ENCOUNTER — TELEPHONE (OUTPATIENT)
Dept: CARDIOLOGY | Facility: MEDICAL CENTER | Age: 55
End: 2020-04-01

## 2020-04-01 DIAGNOSIS — R00.2 PALPITATIONS: ICD-10-CM

## 2020-04-01 DIAGNOSIS — I47.10 SVT (SUPRAVENTRICULAR TACHYCARDIA) (HCC): ICD-10-CM

## 2020-04-01 NOTE — TELEPHONE ENCOUNTER
Luba Alexander M.D.  Debby Humphries R.N.             She has short runs of SVT.  This could explain her symptoms.  Please have her start metoprolol 12.5 mg twice daily.  She should watch her blood pressure regularly and if it starts dropping significantly or she starts having associated symptoms, she should let us know.          Attempted to call pt, no answer, LM for her to call back. Pt does not appear active on MyChart.

## 2020-04-01 NOTE — LETTER
April 7, 2020        Celena Phan  [address] [address]          Dear Celena,      We were unable to reach you via phone. Dr. Alexander reviewed your ZioPatch monitor results. She states you had short runs of supraventricular tachycardia when your heart rate went up to 186 beats per minute during the fastest episode.     She states this could explain your symptoms and is recommending to start you on a medication called Metoprolol to help prevent these episodes.    This medication will help control your heart rate and it can lower your blood pressure as well. We advise you check your blood pressure regularly and if you notice a drop in you readings or if you start to develop symptoms like light-headedness or dizziness, please let us know.    Metoprolol 12.5mg by mouth twice a day has been sent to your [facility] pharmacy on [facility]. Please don't hesitate to give us a call at [phone] if you have any questions or if you would like to discuss this further.      Thank you,    Debby RAYMUNDO for Dr. Alexander

## 2020-04-07 NOTE — TELEPHONE ENCOUNTER
Attempted to call pt again, no answer, LM for her call back. Letter drafted and mailed to pt.    Metoprolol 12.5mg PO BID ordered and sent to pt's [facility] pharmacy on [facility] [address].

## 2020-04-15 NOTE — TELEPHONE ENCOUNTER
Pt called back. Discussed monitor results per AA and recommendations. She states she has felt better with no symptoms since after the monitor was completed. 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 She believes lack of work stress may have helped with her symptoms as well and would prefer not to start new medications at this time.    Highly advised pt to let us know if she has return of symptoms, verbalized understanding. ER precautions advised, appreciative of information.

## 2020-11-30 ENCOUNTER — TELEPHONE (OUTPATIENT)
Dept: CARDIOLOGY | Facility: MEDICAL CENTER | Age: 55
End: 2020-11-30

## 2020-11-30 DIAGNOSIS — R00.2 PALPITATIONS: ICD-10-CM

## 2020-11-30 DIAGNOSIS — I47.10 SVT (SUPRAVENTRICULAR TACHYCARDIA) (HCC): ICD-10-CM

## 2020-11-30 NOTE — TELEPHONE ENCOUNTER
Called pt to discuss. She states about 2 weeks ago, she developed cold symptoms that were minor. About 1 week later, she lost her sense of taste. She did not complete a COVID test however she feels she may have gotten the virus. She started feeling better and back to normal, until about 5 days ago when she noticed her elevated HR. She had symptoms of being jittery, nervous, arms weaker. She is unsure if her past illness symptoms may be contributing to her elevated HR. Pt is also unsure if her c/o anxiety can cause the elevated HR as well.    Advised per previous recommendation in April, pt should start taking metoprolol 12.5mg BID to help with her HR. She should continue checking her BP/HR daily at least for the next few days and give us a call if she does not notice any improvement. Advised anti-anxiety measures and hydration. She confirmed her preferred [facility] pharmacy on [address] [facility]. Pt verbalized understanding, appreciative of call back.    Metoprolol 12.5mg BID sent to pt's pharmacy.

## 2020-11-30 NOTE — TELEPHONE ENCOUNTER
"HEATHER    Pt calling in regards to having tachycardia. Pt states that her HR has been \"out of control lately.\" Pt states she had denied Rx and now is rethinking it and would like to talk to Dr ROMERO to discuss. Pt HR has been between 110-76. Please call pt at [phone].    Thank you   "

## 2020-12-30 ENCOUNTER — HOSPITAL ENCOUNTER (OUTPATIENT)
Dept: LAB | Facility: MEDICAL CENTER | Age: 55
End: 2020-12-30
Attending: PHYSICIAN ASSISTANT
Payer: COMMERCIAL

## 2020-12-30 LAB
COVID ORDER STATUS COVID19: NORMAL
SARS-COV-2 RNA RESP QL NAA+PROBE: NOTDETECTED
SPECIMEN SOURCE: NORMAL

## 2020-12-30 PROCEDURE — C9803 HOPD COVID-19 SPEC COLLECT: HCPCS

## 2020-12-30 PROCEDURE — U0003 INFECTIOUS AGENT DETECTION BY NUCLEIC ACID (DNA OR RNA); SEVERE ACUTE RESPIRATORY SYNDROME CORONAVIRUS 2 (SARS-COV-2) (CORONAVIRUS DISEASE [COVID-19]), AMPLIFIED PROBE TECHNIQUE, MAKING USE OF HIGH THROUGHPUT TECHNOLOGIES AS DESCRIBED BY CMS-2020-01-R: HCPCS

## 2021-01-19 ENCOUNTER — HOSPITAL ENCOUNTER (OUTPATIENT)
Dept: LAB | Facility: MEDICAL CENTER | Age: 56
End: 2021-01-19
Attending: PHYSICIAN ASSISTANT
Payer: COMMERCIAL

## 2021-01-19 PROCEDURE — 88175 CYTOPATH C/V AUTO FLUID REDO: CPT

## 2021-01-20 LAB — CYTOLOGY REG CYTOL: NORMAL

## 2021-02-04 ENCOUNTER — OFFICE VISIT (OUTPATIENT)
Dept: CARDIOLOGY | Facility: MEDICAL CENTER | Age: 56
End: 2021-02-04
Payer: COMMERCIAL

## 2021-02-04 VITALS
OXYGEN SATURATION: 98 % | RESPIRATION RATE: 16 BRPM | DIASTOLIC BLOOD PRESSURE: 82 MMHG | WEIGHT: 159 LBS | SYSTOLIC BLOOD PRESSURE: 124 MMHG | HEIGHT: 68 IN | HEART RATE: 71 BPM | BODY MASS INDEX: 24.1 KG/M2

## 2021-02-04 DIAGNOSIS — Z13.220 SCREENING FOR CHOLESTEROL LEVEL: ICD-10-CM

## 2021-02-04 DIAGNOSIS — I47.10 SVT (SUPRAVENTRICULAR TACHYCARDIA) (HCC): ICD-10-CM

## 2021-02-04 DIAGNOSIS — R00.2 PALPITATIONS: ICD-10-CM

## 2021-02-04 PROCEDURE — 99215 OFFICE O/P EST HI 40 MIN: CPT | Performed by: INTERNAL MEDICINE

## 2021-02-04 RX ORDER — OMEPRAZOLE 20 MG/1
TABLET, DELAYED RELEASE ORAL
COMMUNITY
Start: 2020-12-03 | End: 2021-02-04

## 2021-02-04 RX ORDER — OMEPRAZOLE 20 MG/1
CAPSULE, DELAYED RELEASE ORAL
COMMUNITY
Start: 2020-12-29 | End: 2021-10-14 | Stop reason: SDUPTHER

## 2021-02-04 RX ORDER — CLOBETASOL PROPIONATE 0.5 MG/G
CREAM TOPICAL
COMMUNITY
Start: 2021-01-20 | End: 2021-02-04

## 2021-02-04 RX ORDER — ESTRADIOL AND NORETHINDRONE ACETATE 1; .5 MG/1; MG/1
1 TABLET ORAL DAILY
COMMUNITY
Start: 2021-01-19

## 2021-02-04 RX ORDER — PREDNISOLONE ACETATE 10 MG/ML
SUSPENSION/ DROPS OPHTHALMIC
COMMUNITY
Start: 2021-01-13 | End: 2021-02-04

## 2021-02-04 ASSESSMENT — ENCOUNTER SYMPTOMS
DIZZINESS: 0
ORTHOPNEA: 0
SHORTNESS OF BREATH: 0
DEPRESSION: 0
ABDOMINAL PAIN: 0
PND: 0
FALLS: 0
LOSS OF CONSCIOUSNESS: 0
PALPITATIONS: 1

## 2021-02-04 ASSESSMENT — FIBROSIS 4 INDEX: FIB4 SCORE: 1.445319123384539125

## 2021-02-04 NOTE — PROGRESS NOTES
"Chief Complaint   Patient presents with   • Palpitations       Subjective:   Celena Phna is a 55-year-old female presenting to clinic for follow-up on palpitations.    In November patient was diagnosed with COVID infection.  She recovered at home and was never hospitalized.  She is worried about having myocarditis because one of her friends told her that Covid patients get \"heart infections\".  She denies any heart failure symptoms.    Patient continues to have intermittent palpitations.  She is on low-dose metoprolol which she is tolerating well.  She has gained about 15 pounds since the pandemic started.  She wonders if the last 5 pounds were related to metoprolol.    Past Medical History:   Diagnosis Date   • Heart burn    • Heart valve disease 1990    MVP   • Pain     back     Past Surgical History:   Procedure Laterality Date   • ESOPHAGEAL MOTILITY OR MANOMETRY N/A 2/27/2019    Procedure: ESOPHAGEAL MOTILITY OR MANOMETRY;  Surgeon: Rip Chambers M.D.;  Location: ENDOSCOPY [facility];  Service: Gastroenterology   • MIKO BY LAPAROSCOPY  9/28/2017    Procedure: MIKO BY LAPAROSCOPY;  Surgeon: Claudia Alford M.D.;  Location: SURGERY [facility];  Service: General   • OTHER ABDOMINAL SURGERY  09/2016    gallbladder removal   • OTHER ORTHOPEDIC SURGERY  04/2012    Kyphoplasty   • KYPHOPLASTY  4/1/2010    Performed by SUSAN RIVERA at SURGERY [facility]     Family History   Problem Relation Age of Onset   • Other Neg Hx      Social History     Socioeconomic History   • Marital status:      Spouse name: Not on file   • Number of children: Not on file   • Years of education: Not on file   • Highest education level: Not on file   Occupational History   • Not on file   Social Needs   • Financial resource strain: Not on file   • Food insecurity     Worry: Not on file     Inability: Not on file   • Transportation needs     Medical: Not on file     Non-medical: Not on file   Tobacco Use   • " Smoking status: Never Smoker   • Smokeless tobacco: Never Used   Substance and Sexual Activity   • Alcohol use: No     Comment: rarely   • Drug use: No   • Sexual activity: Yes     Partners: Male   Lifestyle   • Physical activity     Days per week: Not on file     Minutes per session: Not on file   • Stress: Not on file   Relationships   • Social connections     Talks on phone: Not on file     Gets together: Not on file     Attends Jehovah's witness service: Not on file     Active member of club or organization: Not on file     Attends meetings of clubs or organizations: Not on file     Relationship status: Not on file   • Intimate partner violence     Fear of current or ex partner: Not on file     Emotionally abused: Not on file     Physically abused: Not on file     Forced sexual activity: Not on file   Other Topics Concern   • Not on file   Social History Narrative   • Not on file     Allergies   Allergen Reactions   • Erythromycin Itching     Outpatient Encounter Medications as of 2/4/2021   Medication Sig Dispense Refill   • estradiol-norethindrone (ACTIVELLA) 1-0.5 MG per tablet      • norethindrone (AYGESTIN) 5 MG tablet      • omeprazole (PRILOSEC) 20 MG delayed-release capsule      • metoprolol tartrate (LOPRESSOR) 25 MG Tab Take 1 Tab by mouth 2 times a day. 180 Tab 3   • B Complex Vitamins (VITAMIN B COMPLEX PO) Take 1 Tab by mouth every day.     • POTASSIUM PO Take 1 Tab by mouth every day. OTC - Supplement     • [DISCONTINUED] clobetasol (TEMOVATE) 0.05 % Cream      • [DISCONTINUED] omeprazole (PRILOSEC OTC) 20 MG tablet OMEPRAZOLE 20 MG TBEC     • [DISCONTINUED] prednisoLONE acetate (PRED FORTE) 1 % Suspension      • [DISCONTINUED] metoprolol tartrate (LOPRESSOR) 25 MG Tab METOPROLOL TARTRATE 25 MG TABS     • [DISCONTINUED] metoprolol (LOPRESSOR) 25 MG Tab Take 0.5 Tabs by mouth 2 times a day. 90 Tab 0   • [DISCONTINUED] estradiol (ESTRACE) 1 MG Tab Take 1 Tab by mouth every day. (Patient not taking: Reported  "on 2/4/2021) 90 Tab 2   • [DISCONTINUED] Calcium-Magnesium 100-50 MG Tab Take  by mouth.     • [DISCONTINUED] Multiple Vitamins-Minerals (VITAMIN D3 COMPLETE PO) Take  by mouth.       No facility-administered encounter medications on file as of 2/4/2021.      Review of Systems   Constitutional: Negative for malaise/fatigue.   Respiratory: Negative for shortness of breath.    Cardiovascular: Positive for palpitations. Negative for chest pain, orthopnea, leg swelling and PND.   Gastrointestinal: Negative for abdominal pain.   Musculoskeletal: Negative for falls.   Neurological: Negative for dizziness and loss of consciousness.   Psychiatric/Behavioral: Negative for depression.   All other systems reviewed and are negative.       Objective:   /82 (BP Location: Left arm, Patient Position: Sitting, BP Cuff Size: Adult)   Pulse 71   Resp 16   Ht 1.727 m (5' 8\")   Wt 72.1 kg (159 lb)   LMP 03/25/2010   SpO2 98%   BMI 24.18 kg/m²     Physical Exam   Constitutional: She is oriented to person, place, and time. She appears well-developed and well-nourished. No distress.   HENT:   Head: Normocephalic and atraumatic.   Eyes: Conjunctivae are normal. No scleral icterus.   Neck: Normal range of motion. Neck supple.   Cardiovascular: Normal rate, regular rhythm and normal heart sounds. Exam reveals no gallop and no friction rub.   No murmur heard.  Pulmonary/Chest: Effort normal and breath sounds normal. No respiratory distress. She has no wheezes. She has no rales.   Musculoskeletal:         General: No edema.   Neurological: She is alert and oriented to person, place, and time.   Skin: Skin is warm and dry. She is not diaphoretic.   Psychiatric: She has a normal mood and affect. Her behavior is normal. Judgment and thought content normal.   Nursing note and vitals reviewed.    Echocardiogram performed in 2013.  Report was reviewed and did not show any evidence of mitral valve prolapse.  Images were not available for " my review.  Normal LV systolic function.    Ziopatch monitor performed March 2020 was personally reviewed and per my interpretation showed baseline normal sinus with an average heart rate of 79 bpm.  25 runs of SVT noted, longest 19 beats in duration with an average heart rate of 117 bpm.  No sustained arrhythmias.    Assessment:     1. Palpitations  CBC WITHOUT DIFFERENTIAL    Basic Metabolic Panel    EC-ECHOCARDIOGRAM COMPLETE W/O CONT    metoprolol tartrate (LOPRESSOR) 25 MG Tab    TSH    FREE THYROXINE   2. SVT (supraventricular tachycardia) (HCC)  EC-ECHOCARDIOGRAM COMPLETE W/O CONT    metoprolol tartrate (LOPRESSOR) 25 MG Tab   3. Screening for cholesterol level  Lipid Profile     Medical Decision Making:  Today's Assessment / Status / Plan:     Patient continues to complain of ongoing palpitations.  She had short runs of SVT on her Zio patch monitor.  Will increase metoprolol to 25 mg twice daily.  Patient be referred for an echocardiogram to evaluate LV function.  I do not think she has myocarditis related to COVID-19 infection however she is extremely worried and would prefer to be checked.  Basic labs ordered including thyroid function.    Her weight gain is likely secondary to the pandemic and the stress associated with it.  It is possible that it could be iatrogenic from the metoprolol.  For now would continue metoprolol and if she continues to have weight gain will consider transitioning to a calcium channel blocker.    Return to clinic in 6 months or earlier if needed.    Thank you for allowing me to participate in the care of this patient. Please do not hesitate to contact me with any questions.    Luba Alexander MD, [facility]  Cardiologist  [facility]    PLEASE NOTE: This dictation was created using voice recognition software.

## 2021-03-15 DIAGNOSIS — Z23 NEED FOR VACCINATION: ICD-10-CM

## 2021-04-21 ENCOUNTER — HOSPITAL ENCOUNTER (OUTPATIENT)
Dept: CARDIOLOGY | Facility: MEDICAL CENTER | Age: 56
End: 2021-04-21
Attending: INTERNAL MEDICINE
Payer: COMMERCIAL

## 2021-04-21 DIAGNOSIS — I47.10 SVT (SUPRAVENTRICULAR TACHYCARDIA) (HCC): ICD-10-CM

## 2021-04-21 DIAGNOSIS — R00.2 PALPITATIONS: ICD-10-CM

## 2021-04-21 PROCEDURE — 93306 TTE W/DOPPLER COMPLETE: CPT

## 2021-04-22 LAB
LV EJECT FRACT  99904: 60
LV EJECT FRACT MOD 2C 99903: 63.47
LV EJECT FRACT MOD 4C 99902: 55.17
LV EJECT FRACT MOD BP 99901: 58.97

## 2021-04-22 PROCEDURE — 93306 TTE W/DOPPLER COMPLETE: CPT | Mod: 26 | Performed by: INTERNAL MEDICINE

## 2021-08-02 ENCOUNTER — TELEPHONE (OUTPATIENT)
Dept: CARDIOLOGY | Facility: MEDICAL CENTER | Age: 56
End: 2021-08-02

## 2021-08-02 NOTE — TELEPHONE ENCOUNTER
Chart Prep  S/W  Pt in regards to upcoming appt with Dr. Alexander. Pt has standing labs from last office visit with Dr. Alexander. Pt notified me that she plan on having these labs done prior to appt with AA at a [facility] facility. Pt verbally understood that labs are fasting.

## 2021-08-03 ENCOUNTER — HOSPITAL ENCOUNTER (OUTPATIENT)
Dept: LAB | Facility: MEDICAL CENTER | Age: 56
End: 2021-08-03
Attending: INTERNAL MEDICINE
Payer: COMMERCIAL

## 2021-08-03 DIAGNOSIS — R00.2 PALPITATIONS: ICD-10-CM

## 2021-08-03 DIAGNOSIS — Z13.220 SCREENING FOR CHOLESTEROL LEVEL: ICD-10-CM

## 2021-08-03 LAB
ANION GAP SERPL CALC-SCNC: 8 MMOL/L (ref 7–16)
BUN SERPL-MCNC: 19 MG/DL (ref 8–22)
CALCIUM SERPL-MCNC: 9.3 MG/DL (ref 8.5–10.5)
CHLORIDE SERPL-SCNC: 107 MMOL/L (ref 96–112)
CHOLEST SERPL-MCNC: 170 MG/DL (ref 100–199)
CO2 SERPL-SCNC: 26 MMOL/L (ref 20–33)
CREAT SERPL-MCNC: 0.69 MG/DL (ref 0.5–1.4)
ERYTHROCYTE [DISTWIDTH] IN BLOOD BY AUTOMATED COUNT: 45.3 FL (ref 35.9–50)
FASTING STATUS PATIENT QL REPORTED: NORMAL
GLUCOSE SERPL-MCNC: 108 MG/DL (ref 65–99)
HCT VFR BLD AUTO: 45.8 % (ref 37–47)
HDLC SERPL-MCNC: 52 MG/DL
HGB BLD-MCNC: 15 G/DL (ref 12–16)
LDLC SERPL CALC-MCNC: 107 MG/DL
MCH RBC QN AUTO: 32.1 PG (ref 27–33)
MCHC RBC AUTO-ENTMCNC: 32.8 G/DL (ref 33.6–35)
MCV RBC AUTO: 98.1 FL (ref 81.4–97.8)
PLATELET # BLD AUTO: 198 K/UL (ref 164–446)
PMV BLD AUTO: 10.6 FL (ref 9–12.9)
POTASSIUM SERPL-SCNC: 4.3 MMOL/L (ref 3.6–5.5)
RBC # BLD AUTO: 4.67 M/UL (ref 4.2–5.4)
SODIUM SERPL-SCNC: 141 MMOL/L (ref 135–145)
T4 FREE SERPL-MCNC: 1.12 NG/DL (ref 0.93–1.7)
TRIGL SERPL-MCNC: 53 MG/DL (ref 0–149)
TSH SERPL DL<=0.005 MIU/L-ACNC: 4.22 UIU/ML (ref 0.38–5.33)
WBC # BLD AUTO: 4 K/UL (ref 4.8–10.8)

## 2021-08-03 PROCEDURE — 84443 ASSAY THYROID STIM HORMONE: CPT

## 2021-08-03 PROCEDURE — 80048 BASIC METABOLIC PNL TOTAL CA: CPT

## 2021-08-03 PROCEDURE — 85027 COMPLETE CBC AUTOMATED: CPT

## 2021-08-03 PROCEDURE — 36415 COLL VENOUS BLD VENIPUNCTURE: CPT

## 2021-08-03 PROCEDURE — 84439 ASSAY OF FREE THYROXINE: CPT

## 2021-08-03 PROCEDURE — 80061 LIPID PANEL: CPT

## 2021-08-05 ENCOUNTER — OFFICE VISIT (OUTPATIENT)
Dept: CARDIOLOGY | Facility: MEDICAL CENTER | Age: 56
End: 2021-08-05
Payer: COMMERCIAL

## 2021-08-05 VITALS
RESPIRATION RATE: 16 BRPM | HEIGHT: 68 IN | BODY MASS INDEX: 23.19 KG/M2 | HEART RATE: 73 BPM | WEIGHT: 153 LBS | SYSTOLIC BLOOD PRESSURE: 100 MMHG | DIASTOLIC BLOOD PRESSURE: 68 MMHG | OXYGEN SATURATION: 95 %

## 2021-08-05 DIAGNOSIS — R00.2 PALPITATIONS: ICD-10-CM

## 2021-08-05 DIAGNOSIS — I47.10 SVT (SUPRAVENTRICULAR TACHYCARDIA) (HCC): ICD-10-CM

## 2021-08-05 PROCEDURE — 99214 OFFICE O/P EST MOD 30 MIN: CPT | Performed by: INTERNAL MEDICINE

## 2021-08-05 RX ORDER — IBUPROFEN 800 MG
TABLET ORAL DAILY
COMMUNITY
End: 2023-05-18

## 2021-08-05 RX ORDER — UREA 10 %
3 LOTION (ML) TOPICAL
COMMUNITY

## 2021-08-05 ASSESSMENT — ENCOUNTER SYMPTOMS
DIZZINESS: 0
ORTHOPNEA: 0
DEPRESSION: 0
PND: 0
PALPITATIONS: 1
ABDOMINAL PAIN: 0
LOSS OF CONSCIOUSNESS: 0
FALLS: 0
SHORTNESS OF BREATH: 0

## 2021-08-05 ASSESSMENT — FIBROSIS 4 INDEX: FIB4 SCORE: 1.41

## 2021-08-05 NOTE — PROGRESS NOTES
Chief Complaint   Patient presents with   • Palpitations       Subjective:   Celena Phan is a 56-year-old female presenting to clinic for follow-up on palpitations.    Patient has been doing well on the metoprolol.  If on occasion she takes her medication later she starts feeling palpitations.  She wonders if she truly needs the metoprolol.  She has been staying hydrated.  Minimizes her alcohol and caffeine intake.    Past Medical History:   Diagnosis Date   • Heart burn    • Heart valve disease 1990    MVP   • Pain     back     Past Surgical History:   Procedure Laterality Date   • ESOPHAGEAL MOTILITY OR MANOMETRY N/A 2/27/2019    Procedure: ESOPHAGEAL MOTILITY OR MANOMETRY;  Surgeon: Rip Chambers M.D.;  Location: ENDOSCOPY [facility];  Service: Gastroenterology   • MIKO BY LAPAROSCOPY  9/28/2017    Procedure: MIKO BY LAPAROSCOPY;  Surgeon: Claudia Alford M.D.;  Location: SURGERY [facility];  Service: General   • OTHER ABDOMINAL SURGERY  09/2016    gallbladder removal   • OTHER ORTHOPEDIC SURGERY  04/2012    Kyphoplasty   • KYPHOPLASTY  4/1/2010    Performed by SUSAN RIVERA at SURGERY [facility]     Family History   Problem Relation Age of Onset   • Other Neg Hx      Social History     Socioeconomic History   • Marital status:      Spouse name: Not on file   • Number of children: Not on file   • Years of education: Not on file   • Highest education level: Not on file   Occupational History   • Not on file   Tobacco Use   • Smoking status: Never Smoker   • Smokeless tobacco: Never Used   Vaping Use   • Vaping Use: Never used   Substance and Sexual Activity   • Alcohol use: No     Comment: rarely   • Drug use: No   • Sexual activity: Yes     Partners: Male   Other Topics Concern   • Not on file   Social History Narrative   • Not on file     Social Determinants of Health     Financial Resource Strain:    • Difficulty of Paying Living Expenses:    Food Insecurity:    • Worried  About Running Out of Food in the Last Year:    • Ran Out of Food in the Last Year:    Transportation Needs:    • Lack of Transportation (Medical):    • Lack of Transportation (Non-Medical):    Physical Activity:    • Days of Exercise per Week:    • Minutes of Exercise per Session:    Stress:    • Feeling of Stress :    Social Connections:    • Frequency of Communication with Friends and Family:    • Frequency of Social Gatherings with Friends and Family:    • Attends Orthodoxy Services:    • Active Member of Clubs or Organizations:    • Attends Club or Organization Meetings:    • Marital Status:    Intimate Partner Violence:    • Fear of Current or Ex-Partner:    • Emotionally Abused:    • Physically Abused:    • Sexually Abused:      Allergies   Allergen Reactions   • Erythromycin Itching     Outpatient Encounter Medications as of 8/5/2021   Medication Sig Dispense Refill   • Cholecalciferol (VITAMIN D3) 10 MCG (400 UNIT) Cap Take  by mouth every day.     • melatonin 1 mg Tab Take 3 mg by mouth at bedtime.     • estradiol-norethindrone (ACTIVELLA) 1-0.5 MG per tablet Take 1 tablet by mouth every day.     • norethindrone (AYGESTIN) 5 MG tablet      • omeprazole (PRILOSEC) 20 MG delayed-release capsule      • metoprolol tartrate (LOPRESSOR) 25 MG Tab Take 1 Tab by mouth 2 times a day. 180 Tab 3   • B Complex Vitamins (VITAMIN B COMPLEX PO) Take 1 Tab by mouth every day.     • POTASSIUM PO Take 1 Tab by mouth every day. OTC - Supplement       No facility-administered encounter medications on file as of 8/5/2021.     Review of Systems   Constitutional: Negative for malaise/fatigue.   Respiratory: Negative for shortness of breath.    Cardiovascular: Positive for palpitations. Negative for chest pain, orthopnea, leg swelling and PND.   Gastrointestinal: Negative for abdominal pain.   Musculoskeletal: Negative for falls.   Neurological: Negative for dizziness and loss of consciousness.   Psychiatric/Behavioral: Negative  "for depression.   All other systems reviewed and are negative.       Objective:   /68 (BP Location: Left arm, Patient Position: Sitting, BP Cuff Size: Adult)   Pulse 73   Resp 16   Ht 1.727 m (5' 8\")   Wt 69.4 kg (153 lb)   LMP 03/25/2010   SpO2 95%   BMI 23.26 kg/m²     Physical Exam   Constitutional: She is oriented to person, place, and time. She appears well-developed. No distress.   HENT:   Head: Normocephalic and atraumatic.   Eyes: Conjunctivae are normal. No scleral icterus.   Cardiovascular: Normal rate, regular rhythm and normal heart sounds. Exam reveals no gallop and no friction rub.   No murmur heard.  Pulmonary/Chest: Effort normal and breath sounds normal. No respiratory distress. She has no wheezes. She has no rales.   Musculoskeletal:      Cervical back: Normal range of motion and neck supple.   Neurological: She is alert and oriented to person, place, and time.   Skin: Skin is warm and dry. She is not diaphoretic.   Psychiatric: Her behavior is normal. Judgment and thought content normal.   Nursing note and vitals reviewed.    Ziopatch monitor performed March 2020 showed baseline normal sinus with an average heart rate of 79 bpm.  25 runs of SVT noted, longest 19 beats in duration with an average heart rate of 117 bpm.  No sustained arrhythmias.    Assessment:     1. SVT (supraventricular tachycardia) (HCC)     2. Palpitations       Medical Decision Making:  Today's Assessment / Status / Plan:     We have again discussed her Zio patch results with her.  Patient has shortness of SVT.  She has recurrent symptoms if she does not take her metoprolol on time.  I would recommend ongoing use of metoprolol.  I do not think she be a candidate for ablation because of her short runs of SVT but we discussed this and the patient would like medical management.    Echocardiogram performed recently showed normal LV function.    26 minutes were spent on the patient's encounter today.  I was with the " patient discussing the plan of care as noted above for 15 minutes with the rest of the time was spent with chart review and documentation.    Return to clinic in 1 year or earlier if needed.    Thank you for allowing me to participate in the care of this patient. Please do not hesitate to contact me with any questions.    Luba Alexander MD, [facility]  Cardiologist  [facility]    PLEASE NOTE: This dictation was created using voice recognition software.

## 2021-09-29 ENCOUNTER — TELEPHONE (OUTPATIENT)
Dept: SCHEDULING | Facility: IMAGING CENTER | Age: 56
End: 2021-09-29

## 2021-10-14 ENCOUNTER — OFFICE VISIT (OUTPATIENT)
Dept: MEDICAL GROUP | Facility: MEDICAL CENTER | Age: 56
End: 2021-10-14
Payer: COMMERCIAL

## 2021-10-14 VITALS
HEART RATE: 76 BPM | DIASTOLIC BLOOD PRESSURE: 68 MMHG | OXYGEN SATURATION: 96 % | BODY MASS INDEX: 23.08 KG/M2 | TEMPERATURE: 97.7 F | SYSTOLIC BLOOD PRESSURE: 104 MMHG | HEIGHT: 69 IN | WEIGHT: 155.8 LBS

## 2021-10-14 DIAGNOSIS — Z12.11 SCREENING FOR MALIGNANT NEOPLASM OF COLON: ICD-10-CM

## 2021-10-14 DIAGNOSIS — R73.09 ELEVATED HEMOGLOBIN A1C: ICD-10-CM

## 2021-10-14 DIAGNOSIS — K22.4 HYPERCONTRACTILE ESOPHAGUS: ICD-10-CM

## 2021-10-14 DIAGNOSIS — Z12.31 ENCOUNTER FOR SCREENING MAMMOGRAM FOR MALIGNANT NEOPLASM OF BREAST: ICD-10-CM

## 2021-10-14 DIAGNOSIS — R73.03 PREDIABETES: ICD-10-CM

## 2021-10-14 DIAGNOSIS — Z12.11 COLON CANCER SCREENING: ICD-10-CM

## 2021-10-14 DIAGNOSIS — I47.10 SVT (SUPRAVENTRICULAR TACHYCARDIA) (HCC): ICD-10-CM

## 2021-10-14 DIAGNOSIS — Z00.00 PREVENTATIVE HEALTH CARE: ICD-10-CM

## 2021-10-14 DIAGNOSIS — M25.50 ARTHRALGIA, UNSPECIFIED JOINT: ICD-10-CM

## 2021-10-14 DIAGNOSIS — Z98.890 HISTORY OF KYPHOPLASTY: ICD-10-CM

## 2021-10-14 PROCEDURE — 99214 OFFICE O/P EST MOD 30 MIN: CPT | Performed by: PHYSICIAN ASSISTANT

## 2021-10-14 RX ORDER — OMEPRAZOLE 20 MG/1
20 CAPSULE, DELAYED RELEASE ORAL DAILY
Qty: 90 CAPSULE | Refills: 3 | Status: SHIPPED | OUTPATIENT
Start: 2021-10-14

## 2021-10-14 ASSESSMENT — PATIENT HEALTH QUESTIONNAIRE - PHQ9: CLINICAL INTERPRETATION OF PHQ2 SCORE: 0

## 2021-10-14 ASSESSMENT — FIBROSIS 4 INDEX: FIB4 SCORE: 1.41
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
## 2021-10-19 ENCOUNTER — HOSPITAL ENCOUNTER (OUTPATIENT)
Dept: LAB | Facility: MEDICAL CENTER | Age: 56
End: 2021-10-19
Attending: PHYSICIAN ASSISTANT
Payer: COMMERCIAL

## 2021-10-19 LAB
APPEARANCE UR: CLEAR
BILIRUB UR QL STRIP.AUTO: NEGATIVE
COLOR UR: YELLOW
GLUCOSE UR STRIP.AUTO-MCNC: NEGATIVE MG/DL
KETONES UR STRIP.AUTO-MCNC: NEGATIVE MG/DL
LEUKOCYTE ESTERASE UR QL STRIP.AUTO: NEGATIVE
MICRO URNS: NORMAL
NITRITE UR QL STRIP.AUTO: NEGATIVE
PATHOLOGY CONSULT NOTE: NORMAL
PH UR STRIP.AUTO: 6 [PH] (ref 5–8)
PROT UR QL STRIP: NEGATIVE MG/DL
RBC UR QL AUTO: NEGATIVE
SP GR UR STRIP.AUTO: 1.03
UROBILINOGEN UR STRIP.AUTO-MCNC: 0.2 MG/DL

## 2021-10-19 PROCEDURE — 88175 CYTOPATH C/V AUTO FLUID REDO: CPT

## 2021-10-19 PROCEDURE — 81003 URINALYSIS AUTO W/O SCOPE: CPT

## 2021-10-19 PROCEDURE — 87624 HPV HI-RISK TYP POOLED RSLT: CPT

## 2021-10-19 PROCEDURE — 88305 TISSUE EXAM BY PATHOLOGIST: CPT

## 2021-10-20 LAB
CYTOLOGY REG CYTOL: NORMAL
HPV HR 12 DNA CVX QL NAA+PROBE: NEGATIVE
HPV16 DNA SPEC QL NAA+PROBE: NEGATIVE
HPV18 DNA SPEC QL NAA+PROBE: NEGATIVE
SPECIMEN SOURCE: NORMAL

## 2021-11-19 DIAGNOSIS — M54.50 CHRONIC LOW BACK PAIN, UNSPECIFIED BACK PAIN LATERALITY, UNSPECIFIED WHETHER SCIATICA PRESENT: ICD-10-CM

## 2021-11-19 DIAGNOSIS — G89.29 CHRONIC LOW BACK PAIN, UNSPECIFIED BACK PAIN LATERALITY, UNSPECIFIED WHETHER SCIATICA PRESENT: ICD-10-CM

## 2022-02-09 ENCOUNTER — APPOINTMENT (RX ONLY)
Dept: URBAN - METROPOLITAN AREA CLINIC 6 | Facility: CLINIC | Age: 57
Setting detail: DERMATOLOGY
End: 2022-02-09

## 2022-02-09 DIAGNOSIS — D22 MELANOCYTIC NEVI: ICD-10-CM

## 2022-02-09 DIAGNOSIS — L82.0 INFLAMED SEBORRHEIC KERATOSIS: ICD-10-CM

## 2022-02-09 DIAGNOSIS — L81.4 OTHER MELANIN HYPERPIGMENTATION: ICD-10-CM

## 2022-02-09 DIAGNOSIS — L56.8 OTHER SPECIFIED ACUTE SKIN CHANGES DUE TO ULTRAVIOLET RADIATION: ICD-10-CM

## 2022-02-09 DIAGNOSIS — D18.0 HEMANGIOMA: ICD-10-CM

## 2022-02-09 DIAGNOSIS — L57.0 ACTINIC KERATOSIS: ICD-10-CM

## 2022-02-09 DIAGNOSIS — Z71.89 OTHER SPECIFIED COUNSELING: ICD-10-CM

## 2022-02-09 DIAGNOSIS — L82.1 OTHER SEBORRHEIC KERATOSIS: ICD-10-CM

## 2022-02-09 DIAGNOSIS — L85.3 XEROSIS CUTIS: ICD-10-CM

## 2022-02-09 DIAGNOSIS — L29.8 OTHER PRURITUS: ICD-10-CM

## 2022-02-09 DIAGNOSIS — L29.89 OTHER PRURITUS: ICD-10-CM

## 2022-02-09 PROBLEM — D22.5 MELANOCYTIC NEVI OF TRUNK: Status: ACTIVE | Noted: 2022-02-09

## 2022-02-09 PROBLEM — D18.01 HEMANGIOMA OF SKIN AND SUBCUTANEOUS TISSUE: Status: ACTIVE | Noted: 2022-02-09

## 2022-02-09 PROCEDURE — 17110 DESTRUCTION B9 LES UP TO 14: CPT

## 2022-02-09 PROCEDURE — 17003 DESTRUCT PREMALG LES 2-14: CPT | Mod: 59

## 2022-02-09 PROCEDURE — 17000 DESTRUCT PREMALG LESION: CPT | Mod: 59

## 2022-02-09 PROCEDURE — ? SUNSCREEN RECOMMENDATIONS

## 2022-02-09 PROCEDURE — ? COUNSELING

## 2022-02-09 PROCEDURE — 99203 OFFICE O/P NEW LOW 30 MIN: CPT | Mod: 25

## 2022-02-09 PROCEDURE — ? ADDITIONAL NOTES

## 2022-02-09 PROCEDURE — ? LIQUID NITROGEN

## 2022-02-09 ASSESSMENT — LOCATION SIMPLE DESCRIPTION DERM
LOCATION SIMPLE: LEFT LIP
LOCATION SIMPLE: LEFT PRETIBIAL REGION
LOCATION SIMPLE: RIGHT LIP
LOCATION SIMPLE: RIGHT ELBOW
LOCATION SIMPLE: LEFT ELBOW
LOCATION SIMPLE: LEFT UPPER BACK
LOCATION SIMPLE: LEFT SHOULDER
LOCATION SIMPLE: RIGHT SHOULDER
LOCATION SIMPLE: CHEST
LOCATION SIMPLE: RIGHT UPPER BACK
LOCATION SIMPLE: UPPER BACK
LOCATION SIMPLE: RIGHT HAND
LOCATION SIMPLE: LEFT HAND
LOCATION SIMPLE: LEFT CLAVICULAR SKIN
LOCATION SIMPLE: LEFT UPPER ARM
LOCATION SIMPLE: ABDOMEN
LOCATION SIMPLE: RIGHT LOWER BACK
LOCATION SIMPLE: RIGHT UPPER ARM

## 2022-02-09 ASSESSMENT — LOCATION DETAILED DESCRIPTION DERM
LOCATION DETAILED: RIGHT ELBOW
LOCATION DETAILED: LEFT ELBOW
LOCATION DETAILED: RIGHT SUPERIOR LATERAL LOWER BACK
LOCATION DETAILED: LEFT RADIAL DORSAL HAND
LOCATION DETAILED: LEFT ULNAR DORSAL HAND
LOCATION DETAILED: RIGHT DORSAL INDEX METACARPOPHALANGEAL JOINT
LOCATION DETAILED: LEFT RIB CAGE
LOCATION DETAILED: RIGHT MEDIAL UPPER BACK
LOCATION DETAILED: LEFT ANTERIOR SHOULDER
LOCATION DETAILED: LEFT INFERIOR VERMILION LIP
LOCATION DETAILED: LEFT POSTERIOR SHOULDER
LOCATION DETAILED: LEFT LATERAL ABDOMEN
LOCATION DETAILED: RIGHT MID-UPPER BACK
LOCATION DETAILED: RIGHT ANTERIOR DISTAL UPPER ARM
LOCATION DETAILED: LEFT MID-UPPER BACK
LOCATION DETAILED: LEFT MEDIAL SUPERIOR CHEST
LOCATION DETAILED: RIGHT SUPERIOR UPPER BACK
LOCATION DETAILED: LEFT LATERAL DISTAL PRETIBIAL REGION
LOCATION DETAILED: SUPERIOR THORACIC SPINE
LOCATION DETAILED: LEFT CLAVICULAR SKIN
LOCATION DETAILED: RIGHT POSTERIOR SHOULDER
LOCATION DETAILED: LEFT ANTECUBITAL SKIN
LOCATION DETAILED: RIGHT INFERIOR VERMILION LIP
LOCATION DETAILED: RIGHT ANTERIOR SHOULDER
LOCATION DETAILED: MIDDLE STERNUM
LOCATION DETAILED: RIGHT INFERIOR MEDIAL MIDBACK
LOCATION DETAILED: RIGHT INFERIOR MEDIAL UPPER BACK

## 2022-02-09 ASSESSMENT — LOCATION ZONE DERM
LOCATION ZONE: ARM
LOCATION ZONE: LIP
LOCATION ZONE: TRUNK
LOCATION ZONE: HAND
LOCATION ZONE: LEG

## 2022-02-09 NOTE — PROCEDURE: LIQUID NITROGEN
Medical Necessity Information: It is in your best interest to select a reason for this procedure from the list below. All of these items fulfill various CMS LCD requirements except the new and changing color options.
Render Note In Bullet Format When Appropriate: No
Medical Necessity Clause: This procedure was medically necessary because the lesions that were treated were:
Detail Level: Zone
Duration Of Freeze Thaw-Cycle (Seconds): 8
Post-Care Instructions: I reviewed with the patient in detail post-care instructions. Patient is to wear sunprotection, and avoid picking at any of the treated lesions. Pt may apply Vaseline to crusted or scabbing areas.
Show Spray Paint Technique Variable?: Yes
Spray Paint Text: The liquid nitrogen was applied to the skin utilizing a spray paint frosting technique.
Consent: The patient's consent was obtained including but not limited to risks of crusting, scabbing, blistering, scarring, darker or lighter pigmentary change, recurrence, incomplete removal and infection.
Number Of Freeze-Thaw Cycles: 2 freeze-thaw cycles
Detail Level: Detailed

## 2022-02-09 NOTE — PROCEDURE: ADDITIONAL NOTES
Additional Notes: Recommended CeraVe Anti-Itch Cream several times daily. Patient is seeing a pain management specialist for chronic neck/upper thoracic pain. \\nWill consider gabapentin/pregabalin/duloxetine if lack of improvement.
Render Risk Assessment In Note?: no
Detail Level: Zone
Shukri Lee (JANETM)  Podiatric Medicine and Surgery  [address], [address]  [place] [address]  Phone: [phone]  Fax: [phone]  Follow Up Time:

## 2022-02-09 NOTE — PROCEDURE: MIPS QUALITY
Quality 110: Preventive Care And Screening: Influenza Immunization: Influenza Immunization not Administered for Documented Reasons.
Detail Level: Detailed
Quality 226: Preventive Care And Screening: Tobacco Use: Screening And Cessation Intervention: Patient screened for tobacco use and is an ex/non-smoker
Quality 130: Documentation Of Current Medications In The Medical Record: Current Medications Documented
Quality 111:Pneumonia Vaccination Status For Older Adults: Pneumococcal Vaccination not Administered or Previously Received, Reason not Otherwise Specified
Quality 431: Preventive Care And Screening: Unhealthy Alcohol Use - Screening: Patient not identified as an unhealthy alcohol user when screened for unhealthy alcohol use using a systematic screening method

## 2022-03-07 ENCOUNTER — TELEPHONE (OUTPATIENT)
Dept: CARDIOLOGY | Facility: MEDICAL CENTER | Age: 57
End: 2022-03-07
Payer: COMMERCIAL

## 2022-03-07 DIAGNOSIS — R00.2 PALPITATIONS: ICD-10-CM

## 2022-03-07 DIAGNOSIS — I47.10 SVT (SUPRAVENTRICULAR TACHYCARDIA) (HCC): ICD-10-CM

## 2022-03-07 NOTE — TELEPHONE ENCOUNTER
PP of AA  ADD-RO    Pt called to advise they are low on metoprolol tartrate (LOPRESSOR) 25 MG Tab and has another insurance, they would like the new RX to be sent [facility] [address]. They would like call back at [phone] to discuss.    Thank you,  Ariana BENITEZ

## 2022-04-05 ENCOUNTER — APPOINTMENT (OUTPATIENT)
Dept: RADIOLOGY | Facility: MEDICAL CENTER | Age: 57
End: 2022-04-05
Attending: NURSE PRACTITIONER
Payer: COMMERCIAL

## 2022-04-05 DIAGNOSIS — M54.16 LUMBAR RADICULOPATHY: ICD-10-CM

## 2022-04-05 DIAGNOSIS — M54.14 THORACIC NEURITIS: ICD-10-CM

## 2022-04-05 PROCEDURE — 72148 MRI LUMBAR SPINE W/O DYE: CPT

## 2022-04-05 PROCEDURE — 72146 MRI CHEST SPINE W/O DYE: CPT

## 2022-11-18 DIAGNOSIS — I47.10 SVT (SUPRAVENTRICULAR TACHYCARDIA) (HCC): ICD-10-CM

## 2022-11-18 DIAGNOSIS — R00.2 PALPITATIONS: ICD-10-CM

## 2022-11-18 NOTE — TELEPHONE ENCOUNTER
Is the patient due for a refill? Yes    Was the patient seen the past year? No    Date of last office visit: 8/5/21    Does the patient have an upcoming appointment?  No       Provider to refill: ADD JI     Does the patients insurance require a 100 day supply?  No

## 2023-04-03 ENCOUNTER — TELEPHONE (OUTPATIENT)
Dept: CARDIOLOGY | Facility: MEDICAL CENTER | Age: 58
End: 2023-04-03

## 2023-04-03 NOTE — TELEPHONE ENCOUNTER
Refill requested refused as patient had courtesy refill in 11/2022 without scheduling follow up appointment.       Routing to scheduling for assistance.

## 2023-04-03 NOTE — TELEPHONE ENCOUNTER
ADD    Caller: Pedro Luis Dallas    Medication Name and Dosage:    metoprolol tartrate (LOPRESSOR) 25 MG Tab (Order #[id number]) on 11/18/22    Medication amount left: 0    [facility]    Other questions (Topic): N/A    Callback Number (Will only call for issues): [phone]    Thank you,   Danii HOOVER

## 2023-04-17 ENCOUNTER — TELEPHONE (OUTPATIENT)
Dept: CARDIOLOGY | Facility: MEDICAL CENTER | Age: 58
End: 2023-04-17

## 2023-04-17 DIAGNOSIS — I47.10 SVT (SUPRAVENTRICULAR TACHYCARDIA) (HCC): ICD-10-CM

## 2023-04-17 DIAGNOSIS — R00.2 PALPITATIONS: ICD-10-CM

## 2023-04-17 NOTE — TELEPHONE ENCOUNTER
ADD    Caller: Celena Phan    Medication Name and Dosage:  metoprolol tartrate (LOPRESSOR) 25 MG Tab    Medication amount left: 2-3 days    Preferred Pharmacy:  [facility]  [address], [place] [address]  PH. [phone]    Other questions (Topic): Patient has a follow up scheduled now and is wondering if she can have a refill up to her next appointment.    Callback Number (Will only call for issues): [phone]    Thank you,  -Anita LIANG

## 2023-04-18 DIAGNOSIS — I47.10 SVT (SUPRAVENTRICULAR TACHYCARDIA) (HCC): ICD-10-CM

## 2023-04-18 DIAGNOSIS — R00.2 PALPITATIONS: ICD-10-CM

## 2023-04-18 NOTE — TELEPHONE ENCOUNTER
To [facility] as ADD: LOV 08/05/2021 with Dr. Alexander. Prepped RX for one month supply until her FU appt scheduled with DA on 05/18/2023, if you approve, you can just send. Thank you!

## 2023-05-18 ENCOUNTER — OFFICE VISIT (OUTPATIENT)
Dept: CARDIOLOGY | Facility: MEDICAL CENTER | Age: 58
End: 2023-05-18
Attending: INTERNAL MEDICINE
Payer: COMMERCIAL

## 2023-05-18 VITALS
HEART RATE: 66 BPM | OXYGEN SATURATION: 97 % | RESPIRATION RATE: 16 BRPM | DIASTOLIC BLOOD PRESSURE: 60 MMHG | SYSTOLIC BLOOD PRESSURE: 110 MMHG | BODY MASS INDEX: 22.88 KG/M2 | WEIGHT: 151 LBS | HEIGHT: 68 IN

## 2023-05-18 DIAGNOSIS — I47.10 SVT (SUPRAVENTRICULAR TACHYCARDIA) (HCC): ICD-10-CM

## 2023-05-18 DIAGNOSIS — R00.2 PALPITATIONS: ICD-10-CM

## 2023-05-18 PROCEDURE — 99211 OFF/OP EST MAY X REQ PHY/QHP: CPT

## 2023-05-18 PROCEDURE — 3078F DIAST BP <80 MM HG: CPT | Performed by: INTERNAL MEDICINE

## 2023-05-18 PROCEDURE — 3074F SYST BP LT 130 MM HG: CPT | Performed by: INTERNAL MEDICINE

## 2023-05-18 PROCEDURE — 99214 OFFICE O/P EST MOD 30 MIN: CPT | Performed by: INTERNAL MEDICINE

## 2023-05-18 PROCEDURE — 99211 OFF/OP EST MAY X REQ PHY/QHP: CPT | Performed by: INTERNAL MEDICINE

## 2023-05-18 NOTE — PROGRESS NOTES
Cardiology Follow-up Consultation Note    Date of note:    5/18/2023    Primary Care Provider: Jazmín Keith M.D.    Name:             Celena Phan   YOB: 1965  MRN:               [id number]    Chief Complaint   Patient presents with    Palpitations       HISTORY OF PRESENT ILLNESS  Ms. Celena Phan is a 57 y.o. female who returns to see us for follow-up of palpitations and paroxysmal SVT.    Last clinic visit: 8/5/2021 with Dr. Alexander.  Is new to me.    Interim History:  Since last visit, has been doing well from a cardiovascular perspective.  Takes metoprolol 25 mg twice daily without palpitations, dizziness or lightheadedness.    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  Retired 6 months ago and is now enjoying life with vacations and being active.  Stress has significantly reduced.      Past Medical History:   Diagnosis Date    Heart burn     Heart valve disease 1990    MVP    Pain     back         Past Surgical History:   Procedure Laterality Date    ESOPHAGEAL MOTILITY OR MANOMETRY N/A 2/27/2019    Procedure: ESOPHAGEAL MOTILITY OR MANOMETRY;  Surgeon: Rip Chambers M.D.;  Location: ENDOSCOPY [facility];  Service: Gastroenterology    MIKO BY LAPAROSCOPY  9/28/2017    Procedure: MIKO BY LAPAROSCOPY;  Surgeon: Claudia Alford M.D.;  Location: SURGERY [facility];  Service: General    OTHER ABDOMINAL SURGERY  09/2016    gallbladder removal    OTHER ORTHOPEDIC SURGERY  04/2012    Kyphoplasty    KYPHOPLASTY  4/1/2010    Performed by SUSAN RIVERA at SURGERY [facility]         Current Outpatient Medications   Medication Sig Dispense Refill    metoprolol tartrate (LOPRESSOR) 25 MG Tab Take 1 Tablet by mouth 2 times a day. 180 Tablet 3    omeprazole (PRILOSEC) 20 MG delayed-release capsule Take 1 Capsule by mouth every day. 90 Capsule 3    melatonin 1 mg Tab Take 3 mg by mouth at bedtime.      estradiol-norethindrone (ACTIVELLA) 1-0.5 MG per  "tablet Take 1 tablet by mouth every day.      POTASSIUM PO Take 1 Tab by mouth every day. OTC - Supplement       No current facility-administered medications for this visit.       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   Tobacco Use    Smoking status: Never    Smokeless tobacco: Never   Vaping Use    Vaping Use: Never used   Substance and Sexual Activity    Alcohol use: No     Comment: rarely    Drug use: No    Sexual activity: Yes     Partners: Female   Other Topics Concern    Not on file   Social History Narrative    Not on file     Social Determinants of Health     Financial Resource Strain: Not on file   Food Insecurity: Not on file   Transportation Needs: Not on file   Physical Activity: Not on file   Stress: Not on file   Social Connections: Not on file   Intimate Partner Violence: Not on file   Housing Stability: Not on file       Physical Exam:  Ambulatory Vitals  /60 (BP Location: Left arm, Patient Position: Sitting, BP Cuff Size: Adult)   Pulse 66   Resp 16   Ht 1.727 m (5' 8\")   Wt 68.5 kg (151 lb)   SpO2 97%    Oxygen Therapy:  Pulse Oximetry: 97 %  BP Readings from Last 4 Encounters:   05/18/23 110/60   10/14/21 104/68   08/05/21 100/68   02/04/21 124/82       Weight/BMI: Body mass index is 22.96 kg/m².  Wt Readings from Last 4 Encounters:   05/18/23 68.5 kg (151 lb)   10/14/21 70.7 kg (155 lb 12.8 oz)   08/05/21 69.4 kg (153 lb)   02/04/21 72.1 kg (159 lb)       GEN: Well developed, well nourished and in no acute distress.  HEART: no significant JVD, regular rate and " rhythm, normal S1 and S2, no murmurs, no third heart sounds, normal cardiac palpation  LUNG: clear to auscultation bilaterally, no wheezing, no crackles, normal respiratory effort on room air  ABDOMEN: soft, non-tender, non-distended, normal bowel sounds throughout  EXTREMITIES: no peripheral edema noted  VASCULAR: no significantly elevated jugular venous pressure, no carotid bruits noted, radial pulses 2+ and equal      Lab Data Review:  Lab Results   Component Value Date/Time    CHOLSTRLTOT 170 08/03/2021 07:14 AM     (H) 08/03/2021 07:14 AM    HDL 52 08/03/2021 07:14 AM    TRIGLYCERIDE 53 08/03/2021 07:14 AM       Lab Results   Component Value Date/Time    SODIUM 141 08/03/2021 07:14 AM    POTASSIUM 4.3 08/03/2021 07:14 AM    CHLORIDE 107 08/03/2021 07:14 AM    CO2 26 08/03/2021 07:14 AM    GLUCOSE 108 (H) 08/03/2021 07:14 AM    BUN 19 08/03/2021 07:14 AM    CREATININE 0.69 08/03/2021 07:14 AM     Lab Results   Component Value Date/Time    ALKPHOSPHAT 34 12/23/2019 07:39 AM    ASTSGOT 21 12/23/2019 07:39 AM    ALTSGPT 19 12/23/2019 07:39 AM    TBILIRUBIN 0.6 12/23/2019 07:39 AM      Lab Results   Component Value Date/Time    WBC 4.0 (L) 08/03/2021 07:14 AM     Lab Results   Component Value Date/Time    HBA1C 5.5 12/23/2019 07:39 AM    HBA1C 5.9 (H) 02/08/2019 08:12 AM       Cardiac Imaging and Procedures Review:    EKG dated 1/27/2020: My personal interpretation is sinus rhythm    Echo dated 4/22/2021, personally interpreted:   Normal LV size and function, EF 60%  No valve disease    Exercise stress testing (12/14/2012):   1.  Good exercise capacity.   2.  No electrocardiographic evidence for flow-limiting coronary disease.     Cardiac Event Monitor 2020:  Baseline rhythm is sinus with an average heart rate of 79 bpm.   25 runs of SVT were noted, longest 19 beats with an average heart rate of 117 bpm, fastest 5 beats with an average heart rate of 186 bpm (this SVT was the fastest average heart rate)   No  sustained arrhythmias noted.  Rare ectopy.   Some diary entries/triggered events correlating with sinus tachycardia, some with ectopy and one with SVT.      Assessment & Plan     1. SVT (supraventricular tachycardia) (HCC)  metoprolol tartrate (LOPRESSOR) 25 MG Tab      2. Palpitations  metoprolol tartrate (LOPRESSOR) 25 MG Tab          Palpitations and SVT episodes well controlled on metoprolol tartrate 25 mg twice daily.  We discussed switching over to Toprol-XL, however, patient would prefer to continue on current regimen.  She will let us know if she decides to change it.    Blood pressure well controlled.  Discussed symptoms of hypotension which she is currently not experiencing.    I have independently interpreted test results and discussed results and management with the patient.    All of patient's excellent questions were answered to the best of my knowledge and to her satisfaction.  It was a pleasure seeing Ms. Celena Phan in my clinic today. Return if symptoms worsen or fail to improve. Patient is aware to call the cardiology clinic with any questions or concerns.      Delmer Zaldivar MD  [facility]  [address] [address]  Phone: [phone]  Fax: [phone]    Please note that this dictation was created using voice recognition software. I have made every reasonable attempt to correct obvious errors, but it is possible there are errors of grammar and possibly content that I did not discover before finalizing the note.

## 2023-06-21 ENCOUNTER — APPOINTMENT (RX ONLY)
Dept: URBAN - METROPOLITAN AREA CLINIC 6 | Facility: CLINIC | Age: 58
Setting detail: DERMATOLOGY
End: 2023-06-21

## 2023-06-21 DIAGNOSIS — D18.0 HEMANGIOMA: ICD-10-CM

## 2023-06-21 DIAGNOSIS — L57.0 ACTINIC KERATOSIS: ICD-10-CM

## 2023-06-21 DIAGNOSIS — D22 MELANOCYTIC NEVI: ICD-10-CM

## 2023-06-21 DIAGNOSIS — Z71.89 OTHER SPECIFIED COUNSELING: ICD-10-CM

## 2023-06-21 DIAGNOSIS — L85.3 XEROSIS CUTIS: ICD-10-CM

## 2023-06-21 DIAGNOSIS — L82.1 OTHER SEBORRHEIC KERATOSIS: ICD-10-CM

## 2023-06-21 DIAGNOSIS — L81.4 OTHER MELANIN HYPERPIGMENTATION: ICD-10-CM

## 2023-06-21 PROBLEM — D18.01 HEMANGIOMA OF SKIN AND SUBCUTANEOUS TISSUE: Status: ACTIVE | Noted: 2023-06-21

## 2023-06-21 PROBLEM — D22.5 MELANOCYTIC NEVI OF TRUNK: Status: ACTIVE | Noted: 2023-06-21

## 2023-06-21 PROCEDURE — 17003 DESTRUCT PREMALG LES 2-14: CPT

## 2023-06-21 PROCEDURE — ? COUNSELING

## 2023-06-21 PROCEDURE — 99213 OFFICE O/P EST LOW 20 MIN: CPT | Mod: 25

## 2023-06-21 PROCEDURE — ? SUNSCREEN RECOMMENDATIONS

## 2023-06-21 PROCEDURE — ? LIQUID NITROGEN

## 2023-06-21 PROCEDURE — 17000 DESTRUCT PREMALG LESION: CPT

## 2023-06-21 ASSESSMENT — LOCATION DETAILED DESCRIPTION DERM
LOCATION DETAILED: MIDDLE STERNUM
LOCATION DETAILED: LEFT CLAVICULAR SKIN
LOCATION DETAILED: LEFT RIB CAGE
LOCATION DETAILED: RIGHT INFERIOR VERMILION LIP
LOCATION DETAILED: LEFT MID-UPPER BACK
LOCATION DETAILED: RIGHT MEDIAL UPPER BACK
LOCATION DETAILED: RIGHT SUPERIOR UPPER BACK
LOCATION DETAILED: RIGHT ULNAR DORSAL HAND
LOCATION DETAILED: LEFT MEDIAL SUPERIOR CHEST
LOCATION DETAILED: RIGHT INFERIOR MEDIAL MIDBACK
LOCATION DETAILED: RIGHT DORSAL INDEX METACARPOPHALANGEAL JOINT
LOCATION DETAILED: LEFT SUPERIOR FOREHEAD
LOCATION DETAILED: RIGHT INFERIOR MEDIAL UPPER BACK
LOCATION DETAILED: RIGHT DISTAL DORSAL FOREARM
LOCATION DETAILED: LEFT LATERAL ABDOMEN

## 2023-06-21 ASSESSMENT — LOCATION SIMPLE DESCRIPTION DERM
LOCATION SIMPLE: LEFT CLAVICULAR SKIN
LOCATION SIMPLE: RIGHT FOREARM
LOCATION SIMPLE: RIGHT HAND
LOCATION SIMPLE: ABDOMEN
LOCATION SIMPLE: RIGHT LIP
LOCATION SIMPLE: LEFT UPPER BACK
LOCATION SIMPLE: RIGHT UPPER BACK
LOCATION SIMPLE: RIGHT LOWER BACK
LOCATION SIMPLE: LEFT FOREHEAD
LOCATION SIMPLE: CHEST

## 2023-06-21 ASSESSMENT — LOCATION ZONE DERM
LOCATION ZONE: HAND
LOCATION ZONE: ARM
LOCATION ZONE: FACE
LOCATION ZONE: LIP
LOCATION ZONE: TRUNK

## 2023-06-21 NOTE — PROCEDURE: LIQUID NITROGEN
Post-Care Instructions: I reviewed with the patient in detail post-care instructions. Patient is to wear sunprotection, and avoid picking at any of the treated lesions. Pt may apply Vaseline to crusted or scabbing areas.
Render Note In Bullet Format When Appropriate: No
Duration Of Freeze Thaw-Cycle (Seconds): 8
Number Of Freeze-Thaw Cycles: 2 freeze-thaw cycles
Consent: The patient's consent was obtained including but not limited to risks of crusting, scabbing, blistering, scarring, darker or lighter pigmentary change, recurrence, incomplete removal and infection.
Show Aperture Variable?: Yes
Detail Level: Zone

## 2024-02-12 ENCOUNTER — OFFICE VISIT (OUTPATIENT)
Dept: URGENT CARE | Facility: CLINIC | Age: 59
End: 2024-02-12
Payer: COMMERCIAL

## 2024-02-12 VITALS
WEIGHT: 153 LBS | BODY MASS INDEX: 22.66 KG/M2 | OXYGEN SATURATION: 98 % | HEART RATE: 71 BPM | TEMPERATURE: 97.3 F | HEIGHT: 69 IN | DIASTOLIC BLOOD PRESSURE: 80 MMHG | RESPIRATION RATE: 20 BRPM | SYSTOLIC BLOOD PRESSURE: 122 MMHG

## 2024-02-12 DIAGNOSIS — J01.90 ACUTE BACTERIAL RHINOSINUSITIS: ICD-10-CM

## 2024-02-12 DIAGNOSIS — B96.89 ACUTE BACTERIAL RHINOSINUSITIS: ICD-10-CM

## 2024-02-12 DIAGNOSIS — R05.1 ACUTE COUGH: ICD-10-CM

## 2024-02-12 PROCEDURE — 3074F SYST BP LT 130 MM HG: CPT | Performed by: STUDENT IN AN ORGANIZED HEALTH CARE EDUCATION/TRAINING PROGRAM

## 2024-02-12 PROCEDURE — 3079F DIAST BP 80-89 MM HG: CPT | Performed by: STUDENT IN AN ORGANIZED HEALTH CARE EDUCATION/TRAINING PROGRAM

## 2024-02-12 PROCEDURE — 99213 OFFICE O/P EST LOW 20 MIN: CPT | Performed by: STUDENT IN AN ORGANIZED HEALTH CARE EDUCATION/TRAINING PROGRAM

## 2024-02-12 RX ORDER — BENZONATATE 100 MG/1
100 CAPSULE ORAL 3 TIMES DAILY PRN
Qty: 60 CAPSULE | Refills: 0 | Status: SHIPPED | OUTPATIENT
Start: 2024-02-12

## 2024-02-12 RX ORDER — AMOXICILLIN AND CLAVULANATE POTASSIUM 875; 125 MG/1; MG/1
1 TABLET, FILM COATED ORAL 2 TIMES DAILY
Qty: 14 TABLET | Refills: 0 | Status: SHIPPED | OUTPATIENT
Start: 2024-02-12 | End: 2024-02-19

## 2024-02-12 ASSESSMENT — ENCOUNTER SYMPTOMS: COUGH: 1

## 2024-02-12 NOTE — PROGRESS NOTES
"Subjective     Celena Phan is a 58 y.o. female who presents with Cough (1 MONTH)            Cough  This is a new problem. Episode onset: 1 month. The cough is Productive of sputum. Associated symptoms include nasal congestion and postnasal drip. Pertinent negatives include no chest pain, chills, ear pain, fever, sore throat, shortness of breath or wheezing.       Review of Systems   Constitutional:  Negative for chills and fever.   HENT:  Positive for congestion, postnasal drip and sinus pain. Negative for ear pain and sore throat.    Respiratory:  Positive for cough. Negative for shortness of breath and wheezing.    Cardiovascular:  Negative for chest pain and palpitations.   Gastrointestinal:  Negative for abdominal pain, constipation, diarrhea, nausea and vomiting.   All other systems reviewed and are negative.             Objective     /80   Pulse 71   Temp 36.3 °C (97.3 °F) (Temporal)   Resp 20   Ht 1.753 m (5' 9\")   Wt 69.4 kg (153 lb)   LMP 03/25/2010   SpO2 98%   BMI 22.59 kg/m²      Physical Exam  Vitals reviewed.   Constitutional:       General: She is not in acute distress.     Appearance: Normal appearance. She is not toxic-appearing.   HENT:      Head: Normocephalic and atraumatic.      Right Ear: Tympanic membrane, ear canal and external ear normal.      Left Ear: Tympanic membrane, ear canal and external ear normal.      Nose: Mucosal edema and congestion present.      Mouth/Throat:      Mouth: Mucous membranes are moist.      Pharynx: Oropharynx is clear.   Eyes:      Extraocular Movements: Extraocular movements intact.      Conjunctiva/sclera: Conjunctivae normal.      Pupils: Pupils are equal, round, and reactive to light.   Cardiovascular:      Rate and Rhythm: Normal rate and regular rhythm.   Pulmonary:      Effort: Pulmonary effort is normal.      Breath sounds: Normal breath sounds.   Skin:     General: Skin is warm and dry.   Neurological:      General: No focal " deficit present.      Mental Status: She is alert.                             Assessment & Plan        1. Acute bacterial rhinosinusitis  - amoxicillin-clavulanate (AUGMENTIN) 875-125 MG Tab; Take 1 Tablet by mouth 2 times a day for 7 days.  Dispense: 14 Tablet; Refill: 0    2. Acute cough  - benzonatate (TESSALON) 100 MG Cap; Take 1 Capsule by mouth 3 times a day as needed for Cough.  Dispense: 60 Capsule; Refill: 0       Differential diagnoses, supportive care measures and indications for immediate follow-up discussed with patient. Pathogenesis of diagnosis discussed including typical length and natural progression. See AVS. Follow up with PCP.     Instructed to return to urgent care or nearest emergency department if symptoms fail to improve, for any change in condition, further concerns, or new concerning symptoms.    Patient states understanding and agrees with the plan of care and discharge instructions.

## 2024-02-21 ASSESSMENT — ENCOUNTER SYMPTOMS
SHORTNESS OF BREATH: 0
CHILLS: 0
FEVER: 0
DIARRHEA: 0
ABDOMINAL PAIN: 0
SORE THROAT: 0
SINUS PAIN: 1
CONSTIPATION: 0
NAUSEA: 0
VOMITING: 0
WHEEZING: 0
PALPITATIONS: 0

## 2024-05-08 ENCOUNTER — HOSPITAL ENCOUNTER (OUTPATIENT)
Facility: MEDICAL CENTER | Age: 59
End: 2024-05-08
Attending: PHYSICIAN ASSISTANT
Payer: COMMERCIAL

## 2024-05-11 LAB
CYTOLOGIST CVX/VAG CYTO: NORMAL
CYTOLOGY CVX/VAG DOC CYTO: NORMAL
CYTOLOGY CVX/VAG DOC THIN PREP: NORMAL
HPV I/H RISK 4 DNA CVX QL PROBE+SIG AMP: NEGATIVE
NOTE NL11727A: NORMAL
OTHER STN SPEC: NORMAL
STAT OF ADQ CVX/VAG CYTO-IMP: NORMAL

## 2024-05-23 ENCOUNTER — APPOINTMENT (OUTPATIENT)
Dept: LAB | Facility: MEDICAL CENTER | Age: 59
End: 2024-05-23
Payer: COMMERCIAL

## 2024-07-29 DIAGNOSIS — R00.2 PALPITATIONS: ICD-10-CM

## 2024-07-29 DIAGNOSIS — I47.10 SVT (SUPRAVENTRICULAR TACHYCARDIA) (HCC): ICD-10-CM

## 2024-08-13 ENCOUNTER — APPOINTMENT (RX ONLY)
Dept: URBAN - METROPOLITAN AREA CLINIC 6 | Facility: CLINIC | Age: 59
Setting detail: DERMATOLOGY
End: 2024-08-13

## 2024-08-13 DIAGNOSIS — D22 MELANOCYTIC NEVI: ICD-10-CM

## 2024-08-13 DIAGNOSIS — L11.1 TRANSIENT ACANTHOLYTIC DERMATOSIS [GROVER]: ICD-10-CM

## 2024-08-13 DIAGNOSIS — L57.0 ACTINIC KERATOSIS: ICD-10-CM

## 2024-08-13 DIAGNOSIS — L81.4 OTHER MELANIN HYPERPIGMENTATION: ICD-10-CM

## 2024-08-13 DIAGNOSIS — L82.1 OTHER SEBORRHEIC KERATOSIS: ICD-10-CM

## 2024-08-13 DIAGNOSIS — Z71.89 OTHER SPECIFIED COUNSELING: ICD-10-CM

## 2024-08-13 DIAGNOSIS — D18.0 HEMANGIOMA: ICD-10-CM

## 2024-08-13 PROBLEM — D22.72 MELANOCYTIC NEVI OF LEFT LOWER LIMB, INCLUDING HIP: Status: ACTIVE | Noted: 2024-08-13

## 2024-08-13 PROBLEM — D22.5 MELANOCYTIC NEVI OF TRUNK: Status: ACTIVE | Noted: 2024-08-13

## 2024-08-13 PROBLEM — D18.01 HEMANGIOMA OF SKIN AND SUBCUTANEOUS TISSUE: Status: ACTIVE | Noted: 2024-08-13

## 2024-08-13 PROCEDURE — 99213 OFFICE O/P EST LOW 20 MIN: CPT

## 2024-08-13 PROCEDURE — ? ADDITIONAL NOTES

## 2024-08-13 PROCEDURE — ? COUNSELING

## 2024-08-13 PROCEDURE — ? SUNSCREEN RECOMMENDATIONS

## 2024-08-13 ASSESSMENT — LOCATION DETAILED DESCRIPTION DERM
LOCATION DETAILED: LEFT SUPERIOR MEDIAL LOWER BACK
LOCATION DETAILED: LEFT MEDIAL PLANTAR MIDFOOT
LOCATION DETAILED: MIDDLE STERNUM
LOCATION DETAILED: RIGHT INFERIOR MEDIAL MIDBACK
LOCATION DETAILED: LEFT CLAVICULAR SKIN
LOCATION DETAILED: LEFT MID-UPPER BACK

## 2024-08-13 ASSESSMENT — LOCATION SIMPLE DESCRIPTION DERM
LOCATION SIMPLE: LEFT LOWER BACK
LOCATION SIMPLE: LEFT UPPER BACK
LOCATION SIMPLE: LEFT CLAVICULAR SKIN
LOCATION SIMPLE: LEFT PLANTAR SURFACE
LOCATION SIMPLE: CHEST
LOCATION SIMPLE: RIGHT LOWER BACK

## 2024-08-13 ASSESSMENT — LOCATION ZONE DERM
LOCATION ZONE: FEET
LOCATION ZONE: TRUNK

## 2024-08-13 NOTE — PROCEDURE: ADDITIONAL NOTES
Detail Level: Detailed
Render Risk Assessment In Note?: no
Additional Notes: A few lesions treated with cryotherapy on face to ensure resolution.
Additional Notes: Recommended frequent and liberal applications of a thick emollient such as CeraVe Moisturizing Cream with one application applied within 3-5 minutes of bathing.

## 2024-10-03 ENCOUNTER — TELEPHONE (OUTPATIENT)
Dept: CARDIOLOGY | Facility: MEDICAL CENTER | Age: 59
End: 2024-10-03
Payer: COMMERCIAL

## 2024-10-03 DIAGNOSIS — I47.10 SVT (SUPRAVENTRICULAR TACHYCARDIA) (HCC): ICD-10-CM

## 2024-10-03 DIAGNOSIS — R00.2 PALPITATIONS: ICD-10-CM

## 2024-10-03 RX ORDER — METOPROLOL TARTRATE 25 MG/1
25 TABLET, FILM COATED ORAL 2 TIMES DAILY
Qty: 180 TABLET | Refills: 0 | Status: SHIPPED | OUTPATIENT
Start: 2024-10-03

## 2025-03-26 ENCOUNTER — APPOINTMENT (OUTPATIENT)
Dept: URBAN - METROPOLITAN AREA CLINIC 6 | Facility: CLINIC | Age: 60
Setting detail: DERMATOLOGY
End: 2025-03-26

## 2025-03-26 DIAGNOSIS — L56.8 OTHER SPECIFIED ACUTE SKIN CHANGES DUE TO ULTRAVIOLET RADIATION: ICD-10-CM

## 2025-03-26 DIAGNOSIS — L81.4 OTHER MELANIN HYPERPIGMENTATION: ICD-10-CM

## 2025-03-26 DIAGNOSIS — D22 MELANOCYTIC NEVI: ICD-10-CM

## 2025-03-26 DIAGNOSIS — L84 CORNS AND CALLOSITIES: ICD-10-CM

## 2025-03-26 DIAGNOSIS — L82.1 OTHER SEBORRHEIC KERATOSIS: ICD-10-CM

## 2025-03-26 DIAGNOSIS — L44.8 OTHER SPECIFIED PAPULOSQUAMOUS DISORDERS: ICD-10-CM

## 2025-03-26 DIAGNOSIS — Z71.89 OTHER SPECIFIED COUNSELING: ICD-10-CM

## 2025-03-26 DIAGNOSIS — D18.0 HEMANGIOMA: ICD-10-CM

## 2025-03-26 PROBLEM — D23.39 OTHER BENIGN NEOPLASM OF SKIN OF OTHER PARTS OF FACE: Status: ACTIVE | Noted: 2025-03-26

## 2025-03-26 PROBLEM — D18.01 HEMANGIOMA OF SKIN AND SUBCUTANEOUS TISSUE: Status: ACTIVE | Noted: 2025-03-26

## 2025-03-26 PROBLEM — D22.5 MELANOCYTIC NEVI OF TRUNK: Status: ACTIVE | Noted: 2025-03-26

## 2025-03-26 PROCEDURE — ? SUNSCREEN RECOMMENDATIONS

## 2025-03-26 PROCEDURE — 99213 OFFICE O/P EST LOW 20 MIN: CPT

## 2025-03-26 PROCEDURE — ? ADDITIONAL NOTES

## 2025-03-26 PROCEDURE — ? COUNSELING

## 2025-03-26 ASSESSMENT — LOCATION ZONE DERM
LOCATION ZONE: ARM
LOCATION ZONE: TOE
LOCATION ZONE: TRUNK
LOCATION ZONE: LIP

## 2025-03-26 ASSESSMENT — LOCATION SIMPLE DESCRIPTION DERM
LOCATION SIMPLE: CHEST
LOCATION SIMPLE: LEFT LIP
LOCATION SIMPLE: RIGHT LOWER BACK
LOCATION SIMPLE: RIGHT SHOULDER
LOCATION SIMPLE: LEFT UPPER BACK
LOCATION SIMPLE: RIGHT GREAT TOE
LOCATION SIMPLE: LEFT CLAVICULAR SKIN

## 2025-03-26 ASSESSMENT — LOCATION DETAILED DESCRIPTION DERM
LOCATION DETAILED: LEFT INFERIOR VERMILION LIP
LOCATION DETAILED: LEFT MID-UPPER BACK
LOCATION DETAILED: MIDDLE STERNUM
LOCATION DETAILED: RIGHT INFERIOR MEDIAL MIDBACK
LOCATION DETAILED: RIGHT POSTERIOR SHOULDER
LOCATION DETAILED: RIGHT MEDIAL GREAT TOE
LOCATION DETAILED: LEFT CLAVICULAR SKIN

## 2025-03-26 NOTE — PROCEDURE: ADDITIONAL NOTES
Detail Level: Detailed
Additional Notes: Treated with cryotherapy today as a courtesy
Render Risk Assessment In Note?: no
Additional Notes: Paired with a 15 blade and treated with cryotherapy as a courtesy
Additional Notes: One lesion treated with electrodessication as a courtesy.

## 2025-04-02 ENCOUNTER — HOSPITAL ENCOUNTER (OUTPATIENT)
Dept: LAB | Facility: MEDICAL CENTER | Age: 60
End: 2025-04-02
Attending: STUDENT IN AN ORGANIZED HEALTH CARE EDUCATION/TRAINING PROGRAM
Payer: COMMERCIAL

## 2025-04-02 LAB
ALBUMIN SERPL BCP-MCNC: 4.3 G/DL (ref 3.2–4.9)
ALBUMIN/GLOB SERPL: 1.4 G/DL
ALP SERPL-CCNC: 41 U/L (ref 30–99)
ALT SERPL-CCNC: 18 U/L (ref 2–50)
ANION GAP SERPL CALC-SCNC: 9 MMOL/L (ref 7–16)
AST SERPL-CCNC: 20 U/L (ref 12–45)
BASOPHILS # BLD AUTO: 0.9 % (ref 0–1.8)
BASOPHILS # BLD: 0.06 K/UL (ref 0–0.12)
BILIRUB SERPL-MCNC: 0.4 MG/DL (ref 0.1–1.5)
BUN SERPL-MCNC: 18 MG/DL (ref 8–22)
CALCIUM ALBUM COR SERPL-MCNC: 9.4 MG/DL (ref 8.5–10.5)
CALCIUM SERPL-MCNC: 9.6 MG/DL (ref 8.5–10.5)
CHLORIDE SERPL-SCNC: 106 MMOL/L (ref 96–112)
CHOLEST SERPL-MCNC: 173 MG/DL (ref 100–199)
CO2 SERPL-SCNC: 25 MMOL/L (ref 20–33)
CREAT SERPL-MCNC: 0.81 MG/DL (ref 0.5–1.4)
EOSINOPHIL # BLD AUTO: 0.08 K/UL (ref 0–0.51)
EOSINOPHIL NFR BLD: 1.2 % (ref 0–6.9)
ERYTHROCYTE [DISTWIDTH] IN BLOOD BY AUTOMATED COUNT: 47.6 FL (ref 35.9–50)
EST. AVERAGE GLUCOSE BLD GHB EST-MCNC: 123 MG/DL
FASTING STATUS PATIENT QL REPORTED: NORMAL
GFR SERPLBLD CREATININE-BSD FMLA CKD-EPI: 82 ML/MIN/1.73 M 2
GLOBULIN SER CALC-MCNC: 3 G/DL (ref 1.9–3.5)
GLUCOSE SERPL-MCNC: 101 MG/DL (ref 65–99)
HBA1C MFR BLD: 5.9 % (ref 4–5.6)
HCT VFR BLD AUTO: 45.9 % (ref 37–47)
HCV AB SER QL: NORMAL
HDLC SERPL-MCNC: 48 MG/DL
HGB BLD-MCNC: 14.9 G/DL (ref 12–16)
IMM GRANULOCYTES # BLD AUTO: 0.01 K/UL (ref 0–0.11)
IMM GRANULOCYTES NFR BLD AUTO: 0.1 % (ref 0–0.9)
LDLC SERPL CALC-MCNC: 112 MG/DL
LYMPHOCYTES # BLD AUTO: 0.97 K/UL (ref 1–4.8)
LYMPHOCYTES NFR BLD: 14.3 % (ref 22–41)
MCH RBC QN AUTO: 31.8 PG (ref 27–33)
MCHC RBC AUTO-ENTMCNC: 32.5 G/DL (ref 32.2–35.5)
MCV RBC AUTO: 98.1 FL (ref 81.4–97.8)
MONOCYTES # BLD AUTO: 0.46 K/UL (ref 0–0.85)
MONOCYTES NFR BLD AUTO: 6.8 % (ref 0–13.4)
NEUTROPHILS # BLD AUTO: 5.22 K/UL (ref 1.82–7.42)
NEUTROPHILS NFR BLD: 76.7 % (ref 44–72)
NRBC # BLD AUTO: 0 K/UL
NRBC BLD-RTO: 0 /100 WBC (ref 0–0.2)
PLATELET # BLD AUTO: 245 K/UL (ref 164–446)
PMV BLD AUTO: 10 FL (ref 9–12.9)
POTASSIUM SERPL-SCNC: 4.4 MMOL/L (ref 3.6–5.5)
PROT SERPL-MCNC: 7.3 G/DL (ref 6–8.2)
RBC # BLD AUTO: 4.68 M/UL (ref 4.2–5.4)
SODIUM SERPL-SCNC: 140 MMOL/L (ref 135–145)
TRIGL SERPL-MCNC: 65 MG/DL (ref 0–149)
TSH SERPL DL<=0.005 MIU/L-ACNC: 2.18 UIU/ML (ref 0.38–5.33)
WBC # BLD AUTO: 6.8 K/UL (ref 4.8–10.8)

## 2025-04-02 PROCEDURE — 83036 HEMOGLOBIN GLYCOSYLATED A1C: CPT

## 2025-04-02 PROCEDURE — 36415 COLL VENOUS BLD VENIPUNCTURE: CPT

## 2025-04-02 PROCEDURE — 80061 LIPID PANEL: CPT

## 2025-04-02 PROCEDURE — 85025 COMPLETE CBC W/AUTO DIFF WBC: CPT

## 2025-04-02 PROCEDURE — 86803 HEPATITIS C AB TEST: CPT

## 2025-04-02 PROCEDURE — 84443 ASSAY THYROID STIM HORMONE: CPT

## 2025-04-02 PROCEDURE — 80053 COMPREHEN METABOLIC PANEL: CPT

## (undated) DEVICE — TUBE CONNECT SUCTION CLEAR 120 X 1/4" (50EA/CA)"

## (undated) DEVICE — SODIUM CHL IRRIGATION 0.9% 1000ML (12EA/CA)

## (undated) DEVICE — DETERGENT RENUZYME PLUS 10 OZ PACKET (50/BX)

## (undated) DEVICE — NEEDLE INSFL 120MM 14GA VRRS - (20/BX)

## (undated) DEVICE — SYRINGE 3 CC 21 GA X 1-1/2 - NDL SAFETY (50/BX 8BX/CA)

## (undated) DEVICE — TUBE E-T HI-LO CUFF 7.0MM (10EA/PK)

## (undated) DEVICE — SPONGE GAUZE NON-STERILE 4X4 - (2000/CA 10PK/CA)

## (undated) DEVICE — SET LEADWIRE 5 LEAD BEDSIDE DISPOSABLE ECG (1SET OF 5/EA)

## (undated) DEVICE — GOWN WARMING STANDARD FLEX - (30/CA)

## (undated) DEVICE — SUTURE GENERAL

## (undated) DEVICE — NEPTUNE 4 PORT MANIFOLD - (20/PK)

## (undated) DEVICE — HEAD HOLDER JUNIOR/ADULT

## (undated) DEVICE — SET SUCTION/IRRIGATION WITH DISPOSABLE TIP (6/CA )PART #0250-070-520 IS A SUB

## (undated) DEVICE — PROTECTOR ULNA NERVE - (36PR/CA)

## (undated) DEVICE — TROCARCANN&SEAL 5X55 ZTHREAD - 12/BX

## (undated) DEVICE — SYRINGE 50ML CATHETER TIP (40EA/BX 4BX/CA)

## (undated) DEVICE — SENSOR SPO2 NEO LNCS ADHESIVE (20/BX) SEE USER NOTES

## (undated) DEVICE — MASK ANESTHESIA ADULT  - (100/CA)

## (undated) DEVICE — GLOVE BIOGEL INDICATOR SZ 6.5 SURGICAL PF LTX - (50PR/BX 4BX/CA)

## (undated) DEVICE — PACK LAP CHOLE OR - (2EA/CA)

## (undated) DEVICE — ELECTRODE 850 FOAM ADHESIVE - HYDROGEL RADIOTRNSPRNT (50/PK)

## (undated) DEVICE — TRAY SKIN SCRUB PVP WET (20EA/CA) PART #DYND70356 DISCONTINUED

## (undated) DEVICE — BASIN EMESIS DISP. - (250/CA)

## (undated) DEVICE — KIT ROOM DECONTAMINATION

## (undated) DEVICE — SET EXTENSION WITH 2 PORTS (48EA/CA) ***PART #2C8610 IS A SUBSTITUTE*****

## (undated) DEVICE — TUBING INSUFFLATION - (10/BX)

## (undated) DEVICE — SUTURE 4-0 VICRYL PLUS FS-2 - 27 INCH (36/BX)

## (undated) DEVICE — DRESSING TRANSPARENT FILM TEGADERM 2.375 X 2.75"  (100EA/BX)"

## (undated) DEVICE — APPLIER ENDO MEDIUM CLIP (3EA/BX)

## (undated) DEVICE — TROCAR5X55 KII SHIELDED SYS - (6/BX)

## (undated) DEVICE — KIT ANESTHESIA W/CIRCUIT & 3/LT BAG W/FILTER (20EA/CA)

## (undated) DEVICE — ELECTRODE DUAL RETURN W/ CORD - (50/PK)

## (undated) DEVICE — STERI STRIP COMPOUND BENZOIN - TINCTURE 0.6ML WITH APPLICATOR (40EA/BX)

## (undated) DEVICE — GLOVE BIOGEL SZ 6.5 SURGICAL PF LTX (50PR/BX 4BX/CA)

## (undated) DEVICE — CANISTER SUCTION 3000ML MECHANICAL FILTER AUTO SHUTOFF MEDI-VAC NONSTERILE LF DISP  (40EA/CA)

## (undated) DEVICE — GLOVE BIOGEL PI ORTHO SZ 7 PF LF (40PR/BX)

## (undated) DEVICE — LACTATED RINGERS INJ 1000 ML - (14EA/CA 60CA/PF)

## (undated) DEVICE — TUBING CLEARLINK DUO-VENT - C-FLO (48EA/CA)

## (undated) DEVICE — SUCTION INSTRUMENT YANKAUER BULBOUS TIP W/O VENT (50EA/CA)